# Patient Record
Sex: MALE | Race: WHITE | NOT HISPANIC OR LATINO | Employment: OTHER | URBAN - METROPOLITAN AREA
[De-identification: names, ages, dates, MRNs, and addresses within clinical notes are randomized per-mention and may not be internally consistent; named-entity substitution may affect disease eponyms.]

---

## 2019-11-14 ENCOUNTER — APPOINTMENT (OUTPATIENT)
Dept: RADIOLOGY | Facility: CLINIC | Age: 84
End: 2019-11-14
Payer: MEDICARE

## 2019-11-14 VITALS
BODY MASS INDEX: 31.07 KG/M2 | HEIGHT: 68 IN | DIASTOLIC BLOOD PRESSURE: 80 MMHG | SYSTOLIC BLOOD PRESSURE: 122 MMHG | WEIGHT: 205 LBS | HEART RATE: 78 BPM

## 2019-11-14 DIAGNOSIS — M75.42 IMPINGEMENT SYNDROME OF LEFT SHOULDER: Primary | ICD-10-CM

## 2019-11-14 DIAGNOSIS — M25.512 LEFT SHOULDER PAIN, UNSPECIFIED CHRONICITY: ICD-10-CM

## 2019-11-14 PROCEDURE — 73030 X-RAY EXAM OF SHOULDER: CPT

## 2019-11-14 PROCEDURE — 99203 OFFICE O/P NEW LOW 30 MIN: CPT | Performed by: ORTHOPAEDIC SURGERY

## 2019-11-14 NOTE — PROGRESS NOTES
Assessment:     1  Impingement syndrome of left shoulder    2  Left shoulder pain, unspecified chronicity        Plan:     Problem List Items Addressed This Visit        Other    Impingement syndrome of left shoulder - Primary     - Findings consistent with left shoulder impingement  Discussed findings and treatment options with the patient  I reviewed patient's left shoulder x-ray with him  I discussed prognosis of his condition   - At this time, a cortisone injection is not recommended due to not having a lot of pain today  - It's recommended a course of formal physical therapy due to the symptoms being mechanical issue    - Follow up in 6-8 weeks  - All patient's questions were answered to his satisfaction  This note is created using dictation transcription  It may contain typographical errors, grammatical errors, improperly dictated words, background noise and other errors  Relevant Orders    Ambulatory referral to Physical Therapy      Other Visit Diagnoses     Left shoulder pain, unspecified chronicity        Relevant Orders    XR shoulder 2+ vw left    Ambulatory referral to Physical Therapy         Subjective:     Patient ID: Yessi Ashton is a 80 y o  male  Chief Complaint:   Patient states that the left shoulder started about 2-3 months ago, with no known injury  RHD  He notes that it doesn't limit him with his activities, he just has localized pain on the lateral aspect of the shoulder  He does feel he has lost strength  He has no tried any oral NSAIDs or tylenol  Has some mild pain with putting his arm into a shirt first   No hx of injuries to the shoulder or surgeries  He denies numbness and tingling to the left hand  Information on patient's intake form was reviewed      Allergy:  Allergies   Allergen Reactions    Morphine And Related Other (See Comments)     confused     Medications:  all current active meds have been reviewed  Past Medical History:  Past Medical History: Diagnosis Date    Bilateral hearing loss     has h a  bilaterally    CAD (coronary artery disease)     Cataract of both eyes     Diabetes mellitus ([address] )     Enlarged prostate     HTN (hypertension)     Hx of bladder cancer     Myocardial infarction [facility]) 2013    Wears glasses      Past Surgical History:  Past Surgical History:   Procedure Laterality Date    CHOLECYSTECTOMY      CORONARY ARTERY BYPASS GRAFT  1999    X 3 done @ [facility] [address] CYSTOSTOMY W/ BLADDER BIOPSY      X 2    HERNIA REPAIR       [facility] CATARACT EXTRACAP,INSERT LENS Left 7/20/2016    Procedure: EXTRACTION EXTRACAPSULAR CATARACT PHACO INTRAOCULAR LENS (IOL); Surgeon: Óscar Nation MD;  Location:  MAIN OR;  Service: Ophthalmology     [address] Right 7/6/2016    Procedure: EXTRACTION EXTRACAPSULAR CATARACT PHACO INTRAOCULAR LENS (IOL); Surgeon: Óscar Nation MD;  Location: [facility];  Service: Ophthalmology     Family History:  Family History   Problem Relation Age of Onset    Cancer Mother     Cancer Father      Social History:  Social History     Substance and Sexual Activity   Alcohol Use No     Social History     Substance and Sexual Activity   Drug Use No     Social History     Tobacco Use   Smoking Status Never Smoker   Smokeless Tobacco Never Used     Review of Systems   Constitutional: Negative  HENT: Negative  Eyes: Negative  Respiratory: Negative  Cardiovascular: Negative  Gastrointestinal: Negative  Endocrine: Negative  Genitourinary: Negative  Musculoskeletal: Positive for arthralgias (Left shoulder)  Negative for joint swelling and neck pain  Skin: Negative  Neurological: Negative  Hematological: Negative  Psychiatric/Behavioral: Negative            Objective:  BP Readings from Last 1 Encounters:   11/14/19 122/80      Wt Readings from Last 1 Encounters:   11/14/19 93 kg (205 lb)      BMI:   Estimated body mass index is 31 17 kg/m² as calculated from the following:    Height as of this encounter: 5' 8" (1 727 m)  Weight as of this encounter: 93 kg (205 lb)  BSA:   Estimated body surface area is 2 07 meters squared as calculated from the following:    Height as of this encounter: 5' 8" (1 727 m)  Weight as of this encounter: 93 kg (205 lb)  Physical Exam   Constitutional: He is oriented to person, place, and time  He appears well-developed  HENT:   Head: Normocephalic and atraumatic  Eyes: Conjunctivae and EOM are normal    Neck: Neck supple  Pulmonary/Chest: Effort normal    Neurological: He is alert and oriented to person, place, and time  Skin: Skin is warm  Psychiatric: He has a normal mood and affect  Nursing note and vitals reviewed  Right Shoulder Exam     Range of Motion   Internal rotation 0 degrees: T8       Left Shoulder Exam     Tenderness   Left shoulder tenderness location: lateral aspect of the shoulder at the insertion of the deltoid  Range of Motion   Active abduction: 160 (no pain)   External rotation: 60 (no pain)   Forward flexion: 170 (no pain)   Internal rotation 0 degrees: T8     Muscle Strength   The patient has normal left shoulder strength  Tests   Apprehension: negative  [place] test: positive  Cross arm: negative  Impingement: positive  Drop arm: negative    Other   Erythema: absent  Sensation: normal  Pulse: present     Comments:  4/5 elbow flexion with some pain but no anterior shoulder pain  No pain with pronation/supination  - Speeds test            I have personally reviewed pertinent films in PACS and my interpretation is no fracture, dislocation of the Left shoulder, age appropriate arthritic change  No soft tissue calcification      Scribe Attestation    I,:   Radha Rizvi am acting as a scribe while in the presence of the attending physician :        I,:   Anitha Carolina MD personally performed the services described in this documentation    as scribed in my presence :

## 2019-11-14 NOTE — ASSESSMENT & PLAN NOTE
- Findings consistent with left shoulder impingement  Discussed findings and treatment options with the patient  I reviewed patient's left shoulder x-ray with him  I discussed prognosis of his condition   - At this time, a cortisone injection is not recommended due to not having a lot of pain today  - It's recommended a course of formal physical therapy due to the symptoms being mechanical issue    - Follow up in 6-8 weeks  - All patient's questions were answered to his satisfaction  This note is created using dictation transcription  It may contain typographical errors, grammatical errors, improperly dictated words, background noise and other errors

## 2019-11-21 ENCOUNTER — EVALUATION (OUTPATIENT)
Dept: PHYSICAL THERAPY | Facility: CLINIC | Age: 84
End: 2019-11-21
Payer: MEDICARE

## 2019-11-21 DIAGNOSIS — M25.512 LEFT SHOULDER PAIN, UNSPECIFIED CHRONICITY: Primary | ICD-10-CM

## 2019-11-21 DIAGNOSIS — M75.42 IMPINGEMENT SYNDROME OF LEFT SHOULDER: ICD-10-CM

## 2019-11-21 PROCEDURE — 97161 PT EVAL LOW COMPLEX 20 MIN: CPT | Performed by: PHYSICAL THERAPIST

## 2019-11-21 PROCEDURE — 97110 THERAPEUTIC EXERCISES: CPT | Performed by: PHYSICAL THERAPIST

## 2019-11-21 NOTE — PROGRESS NOTES
PT Evaluation     Today's date: 2019  Patient name: Cassia Villanueva  : 1933  MRN: [id number]  Referring provider: Viki Edward MD  Dx:   Encounter Diagnosis     ICD-10-CM    1  Left shoulder pain, unspecified chronicity M25 512 Ambulatory referral to Physical Therapy   2  Impingement syndrome of left shoulder M75 42 Ambulatory referral to Physical Therapy                  Assessment  Assessment details: Patient is a 80y o  year old male presenting to physical therapy with left shoulder pain  Patient presents with pain, decreased strength, decreased ROM, decreased joint mobility and decreased tolerance to activity  Due to these impairments patient has difficulty performing activities of daily living, recreational activities and engaging in social activities  Patient would benefit from skilled physical therapy services to address these impairments and to maximize function  Thank you for the referral    Impairments: abnormal or restricted ROM, activity intolerance, impaired physical strength, pain with function and scapular dyskinesis  Understanding of Dx/Px/POC: excellent  Goals  Impairment Goals:  1 ) Pt will have decreased sx at worst by 50% in 2-4 weeks  2 ) Pt will have improved shoulder flexion and abduction range of motion by 15 degrees in 2-4 weeks  3 ) Pt will have improved shoulder strength by 1/2 MMT without pain in 4-6 weeks  4 ) Pt will have decreased tenderness to palpation to left rotator cuff by 50% in 3-4 weeks  Functional Goals:  1 ) Pt will be independent in their home exercise program in 1 week  2 ) Pt will be able to reach a shelf at shoulder height without pain in 4-6 weeks  3 ) Pt will be able to complete all ADLs without pain in 6-8 weeks  4 ) Pt will have an improved FOTO score of 64/100 in 6-8 weeks        Plan  Patient would benefit from: skilled PT  Planned therapy interventions: joint mobilization, body mechanics training, balance, patient education, therapeutic exercise, home exercise program, neuromuscular re-education, strengthening, abdominal trunk stabilization, graded exercise, stretching and manual therapy  Frequency: 1x week  Duration in weeks: 8  Plan of Care beginning date: 2019  Plan of Care expiration date: 2020  Treatment plan discussed with: patient        Subjective Evaluation    History of Present Illness  Mechanism of injury: Pt is an 80 y o  Male presenting to physical therapy with left shoulder pain  Pt explains no MIRI, started about 2-3 months ago  Pt reports that his pain is local to the lateral aspect of his left shoulder, is not limiting him from ADL's at this point  Pt reports that his pain can radiate down into the lateral aspect of his left humerus  Doesn't travel down past his elbow  Pain is described as an ache  Pt denies shoulder weakness, neck pain, numbness and tingling, radiating pain down his left arm or clicking/popping in the shoulder  Pt reports that there is no rhyme or reason for when he shoulder bothers him  Aggs: Putting on a shirt, overhead activity, putting on a jacket, putting dishes away, doing the laundry  Eases: Rest  Pain  Current pain ratin  At best pain ratin  At worst pain rating: 10          Objective     Static Posture     Head  Forward  Shoulders  Rounded  Thoracic Spine  Hyperkyphosis  Palpation   Left   Tenderness of the middle deltoid, subscapularis, supraspinatus, teres major and teres minor  Tenderness     Left Shoulder   Tenderness in the infraspinatus tendon and supraspinatus tendon       Active Range of Motion   Left Shoulder   Flexion: 120 degrees   Abduction: 140 degrees with pain  External rotation BTH: C4   Internal rotation BTB: L2 with pain    Right Shoulder   Flexion: 125 degrees   Abduction: 145 degrees   External rotation BTH: C7   Internal rotation BTB: T12     Passive Range of Motion   Left Shoulder   Flexion: 159 degrees   Abduction: 160 degrees   External rotation 45°: 65 degrees   Internal rotation 45°: 75 degrees     Joint Play   Left Shoulder  Hypomobile in the posterior capsule and inferior capsule  Right Shoulder  Hypomobile in the posterior capsule and inferior capsule  Hypomobile: C6, C7, T1, T2, T3, T4, T5, T6, T7, T8, T9 and T10     Strength/Myotome Testing     Left Shoulder     Planes of Motion   Flexion: 4-   Extension: 4   Abduction: 4- (P!)   External rotation at 0°: 4- (P!)   Internal rotation at 0°: 4- (P!)     Right Shoulder     Planes of Motion   Flexion: 4   Extension: 4   Abduction: 4   External rotation at 0°: 4   Internal rotation at 0°: 4     Tests     Left Shoulder   Positive Hawkin's and painful arc  Negative apprehension, empty can, full can, laxity (step off) and posterior apprehension          Flowsheet Rows      Most Recent Value   PT/OT G-Codes   Current Score  53   Projected Score  64                   Precautions: [place], COPD, Hx Angina, Type II DM, MI (2013)    Daily Treatment Diary       Manuals 11/21            PROM L Shoulder                                                    Exercise Diary              Shoulder blade squeezes 10x10''            TB Ext/Row 2x10 GTB            Supine cane flexion 10x10''            Seated thoracic ext s' 5x15''            Doorway stretch exlbow ext 5x15''                                                                                                                                                                                                                                         Modalities

## 2019-11-25 ENCOUNTER — OFFICE VISIT (OUTPATIENT)
Dept: PHYSICAL THERAPY | Facility: CLINIC | Age: 84
End: 2019-11-25
Payer: MEDICARE

## 2019-11-25 DIAGNOSIS — M75.42 IMPINGEMENT SYNDROME OF LEFT SHOULDER: ICD-10-CM

## 2019-11-25 DIAGNOSIS — M25.512 LEFT SHOULDER PAIN, UNSPECIFIED CHRONICITY: Primary | ICD-10-CM

## 2019-11-25 PROCEDURE — 97140 MANUAL THERAPY 1/> REGIONS: CPT | Performed by: PHYSICAL THERAPIST

## 2019-11-25 PROCEDURE — 97110 THERAPEUTIC EXERCISES: CPT | Performed by: PHYSICAL THERAPIST

## 2019-11-25 NOTE — PROGRESS NOTES
Daily Note     Today's date: 2019  Patient name: Ganga Collins  : 1933  MRN: [id number]  Referring provider: Darren Lainez MD  Dx:   Encounter Diagnosis     ICD-10-CM    1  Left shoulder pain, unspecified chronicity M25 512    2  Impingement syndrome of left shoulder M75 42                   Subjective: Patient presents to clinic reporting 0/10 pain at L shoulder, but would have pain if he moved his shoulder "the wrong way"  He reports his shoulder has improved since last visit The patient also notes compliance with HEP, noting no issues  Objective: See treatment diary below      Assessment: Tolerated treatment well  Patient exhibited good technique with therapeutic exercises and would benefit from continued PT  Patient reported feeling pain in his quads with TB ER (no monies)- which may have been attributed to his posture seated on the table  Plan: Continue per plan of care              Precautions: [place], COPD, Hx Angina, Type II DM, MI ()    Daily Treatment Diary       Manuals            PROM L Shoulder  BH           Inf G-H w/ abduction  BH Gr III           PA mobs w/ ER  BH Gr II+                        Exercise Diary              Shoulder blade squeezes 10x10'' 10x10"           TB Ext/Row 2x10 GTB 2x10 GTB           Supine cane flexion 10x10'' 10x10''           Supine Cane ER  10x10''           Supine Cane scaption                          Seated thoracic ext s' 5x15''            Doorway stretch exlbow ext 5x15''            TB no monies  2x10 RTB           TB Scaption  2x10  GTB                                                                                                                                                                                                              Modalities                                             Patient was treated by [facility] SPT, under direct supervision of PT

## 2019-11-27 ENCOUNTER — APPOINTMENT (OUTPATIENT)
Dept: PHYSICAL THERAPY | Facility: CLINIC | Age: 84
End: 2019-11-27
Payer: MEDICARE

## 2019-12-04 ENCOUNTER — OFFICE VISIT (OUTPATIENT)
Dept: PHYSICAL THERAPY | Facility: CLINIC | Age: 84
End: 2019-12-04
Payer: MEDICARE

## 2019-12-04 DIAGNOSIS — M75.42 IMPINGEMENT SYNDROME OF LEFT SHOULDER: ICD-10-CM

## 2019-12-04 DIAGNOSIS — M25.512 LEFT SHOULDER PAIN, UNSPECIFIED CHRONICITY: Primary | ICD-10-CM

## 2019-12-04 PROCEDURE — 97140 MANUAL THERAPY 1/> REGIONS: CPT | Performed by: PHYSICAL THERAPIST

## 2019-12-04 PROCEDURE — 97110 THERAPEUTIC EXERCISES: CPT | Performed by: PHYSICAL THERAPIST

## 2019-12-04 NOTE — PROGRESS NOTES
Daily Note     Today's date: 2019  Patient name: Denver Weber  : 1933  MRN: [id number]  Referring provider: Susan Cassidy MD  Dx:   Encounter Diagnosis     ICD-10-CM    1  Left shoulder pain, unspecified chronicity M25 512    2  Impingement syndrome of left shoulder M75 42                   Subjective: Pt explains that his shoulder has been feeling better recently, notes that his pain levels are greatly reduced  Objective: See treatment diary below      Assessment: Tolerated treatment well  Patient demonstrated fatigue post treatment  Pt with good challenge with new TE this session, fatigue upon completion  Cueing to correct form and shoulder blade position during session, fatigue in periscapular musculature at end of session  Updated HEP to reflect changes made this session  Plan: Continue per plan of care            Precautions: [place], COPD, Hx Angina, Type II DM, MI ()    Daily Treatment Diary       Manuals           PROM L Shoulder  BH RN          Inf G-H w/ abduction  BH Gr III RN Gr 3          PA mobs w/ ER  BH Gr II+ RN Gr 3                       Exercise Diary              Shoulder blade squeezes 10x10'' 10x10"           TB Ext/Row 2x10 GTB 2x10 GTB 2x10 BTB          Supine cane flexion 10x10'' 10x10'' 10x10''          Supine Cane ER  10x10'' 10x10''          Supine Cane scaption                          Seated thoracic ext s' 5x15''            Doorway stretch exlbow ext 5x15''  5x15''          TB no monies  2x10 RTB           TB Scaption  2x10  GTB 2x10 GTB          Supine TB horiz  abd   2x10 GTB          TB IR/ER   2x10 BTB/GTB                                                                                                                                                                                   Modalities

## 2019-12-10 ENCOUNTER — OFFICE VISIT (OUTPATIENT)
Dept: PHYSICAL THERAPY | Facility: CLINIC | Age: 84
End: 2019-12-10
Payer: MEDICARE

## 2019-12-10 DIAGNOSIS — M25.512 LEFT SHOULDER PAIN, UNSPECIFIED CHRONICITY: Primary | ICD-10-CM

## 2019-12-10 DIAGNOSIS — M75.42 IMPINGEMENT SYNDROME OF LEFT SHOULDER: ICD-10-CM

## 2019-12-10 PROCEDURE — 97140 MANUAL THERAPY 1/> REGIONS: CPT | Performed by: PHYSICAL THERAPIST

## 2019-12-10 PROCEDURE — 97110 THERAPEUTIC EXERCISES: CPT | Performed by: PHYSICAL THERAPIST

## 2019-12-10 NOTE — PROGRESS NOTES
Daily Note     Today's date: 12/10/2019  Patient name: Woody Garcia  : 1933  MRN: [id number]  Referring provider: Jarvis Fernández MD  Dx:   Encounter Diagnosis     ICD-10-CM    1  Left shoulder pain, unspecified chronicity M25 512    2  Impingement syndrome of left shoulder M75 42                   Subjective: Patient presents to clinic reporting he has decreased pain at L shoulder since LV  The patient also notes compliance with HEP  Objective: See treatment diary below      Assessment: Tolerated treatment well  Patient demonstrated fatigue post treatment and exhibited good technique with therapeutic exercises  Patient reported no pain or discomfort during tx today, required TC and VC's to engage periscapular mm twice during resisted band exercises  Plan: Continue per plan of care  Progress treament per protocol            Precautions: [place], COPD, Hx Angina, Type II DM, MI ()    Daily Treatment Diary       Manuals 11/21 11/25 12/4 12/10         PROM L Shoulder  BH RN BH         Inf G-H w/ abduction  BH Gr III RN Gr 3 BH         PA mobs w/ ER  BH Gr II+ RN Gr 3 BH                      Exercise Diary              Shoulder blade squeezes 10x10'' 10x10"           TB Ext/Row 2x10 GTB 2x10 GTB 2x10 BTB 2x10 GTB         Supine cane flexion 10x10'' 10x10'' 10x10'' 10x10''         Supine Cane ER  10x10'' 10x10'' 10x10''         Supine Cane scaption                          Seated thoracic ext s' 5x15''            Doorway stretch elbow ext 5x15''  5x15''          TB no monies  2x10 RTB  2x10  GTB         TB Scaption  2x10  GTB 2x10 GTB 2x10 GTB         Supine TB horiz  abd   2x10 GTB 2x10 GTB         TB IR/ER   2x10 BTB/GTB 2x15 BTB/GTB         TB D1 Shoulder ext    2x10  GTB         Standing TB abd    2x10  GTB                                                                                                                                                        Modalities Patient was treated by [facility] SPT, under direct supervision of PT

## 2019-12-16 ENCOUNTER — OFFICE VISIT (OUTPATIENT)
Dept: PHYSICAL THERAPY | Facility: CLINIC | Age: 84
End: 2019-12-16
Payer: MEDICARE

## 2019-12-16 DIAGNOSIS — M75.42 IMPINGEMENT SYNDROME OF LEFT SHOULDER: ICD-10-CM

## 2019-12-16 DIAGNOSIS — M25.512 LEFT SHOULDER PAIN, UNSPECIFIED CHRONICITY: Primary | ICD-10-CM

## 2019-12-16 PROCEDURE — 97110 THERAPEUTIC EXERCISES: CPT | Performed by: PHYSICAL THERAPIST

## 2019-12-16 PROCEDURE — 97140 MANUAL THERAPY 1/> REGIONS: CPT | Performed by: PHYSICAL THERAPIST

## 2019-12-16 NOTE — PROGRESS NOTES
Daily Note     Today's date: 2019  Patient name: Kalpana Josue  : 1933  MRN: [id number]  Referring provider: Dara Perez MD  Dx:   Encounter Diagnosis     ICD-10-CM    1  Left shoulder pain, unspecified chronicity M25 512    2  Impingement syndrome of left shoulder M75 42                   Subjective: Patient presents to clinic reporting 5/10 pain at L shoulder  The patient also notes compliance with HEP  Objective: See treatment diary below      Assessment: Tolerated treatment well  Patient demonstrated fatigue post treatment, exhibited good technique with therapeutic exercises and would benefit from continued PT  Patient required less verbal and auditory cues during therex today  He reported minor shoulder discomfort during a few theraband exercises but nothing over 2/10 pain  Plan: Continue per plan of care  Progress treatment as tolerated             Precautions: [place], COPD, Hx Angina, Type II DM, MI ()    Daily Treatment Diary       Manuals 11/21 11/25 12/4 12/10 12/16        PROM L Shoulder  BH RN [facility]        Inf G-H w/ abduction  BH Gr III RN Gr 3 [facility]        PA mobs w/ ER  BH Gr II+ RN Gr 3 [facility]                     Exercise Diary              Shoulder blade squeezes 10x10'' 10x10"           TB Ext/Row 2x10 GTB 2x10 GTB 2x10 BTB 2x10 GTB         Supine cane flexion 10x10'' 10x10'' 10x10'' 10x10'' 10x10''        Supine Cane ER  10x10'' 10x10'' 10x10'' 10x10''        Supine Cane scaption                          Seated thoracic ext s' 5x15''            Doorway stretch elbow ext 5x15''  5x15''          TB no monies  2x10 RTB  2x10  GTB 2x10  BTB        TB Scaption  2x10  GTB 2x10 GTB 2x10 GTB         Supine TB horiz  abd   2x10 GTB 2x10 GTB 2x10 GTB        TB IR/ER   2x10 BTB/GTB 2x15 BTB/GTB 2x15 BTB        TB D1 Shoulder ext    2x10  GTB 2x10  BTB        Standing TB abd    2x10  GTB 2x10  BTB        Standing TB D2 Ext     2x10  GTB Modalities                                             Patient was treated by [facility] SPT, under direct supervision of PT

## 2019-12-26 ENCOUNTER — OFFICE VISIT (OUTPATIENT)
Dept: PHYSICAL THERAPY | Facility: CLINIC | Age: 84
End: 2019-12-26
Payer: MEDICARE

## 2019-12-26 DIAGNOSIS — M75.42 IMPINGEMENT SYNDROME OF LEFT SHOULDER: ICD-10-CM

## 2019-12-26 DIAGNOSIS — M25.512 LEFT SHOULDER PAIN, UNSPECIFIED CHRONICITY: Primary | ICD-10-CM

## 2019-12-26 PROCEDURE — 97110 THERAPEUTIC EXERCISES: CPT | Performed by: PHYSICAL THERAPIST

## 2019-12-26 PROCEDURE — 97140 MANUAL THERAPY 1/> REGIONS: CPT | Performed by: PHYSICAL THERAPIST

## 2019-12-26 NOTE — PROGRESS NOTES
Daily Note     Today's date: 2019  Patient name: Juaquin Manzo  : 1933  MRN: [id number]  Referring provider: Andreea Garza MD  Dx:   Encounter Diagnosis     ICD-10-CM    1  Left shoulder pain, unspecified chronicity M25 512    2  Impingement syndrome of left shoulder M75 42                   Subjective: Patient presents to clinic reporting 0/10 pain at L shoulder  The patient also notes compliance with HEP  Objective: See treatment diary below      Assessment: Tolerated treatment well  Patient demonstrated fatigue post treatment and would benefit from continued PT  Patient progressed exercises intensity and progressed as per flow sheet, noting no pain during therex  Plan: Continue per plan of care            Precautions: [place], COPD, Hx Angina, Type II DM, MI ()    Daily Treatment Diary       Manuals 11/21 11/25 12/4 12/10 12/16 12/26       PROM L Shoulder  BH RN [facility]       Inf G-H w/ abduction   Gr III RN Gr 3 [facility]       PA mobs w/ ER   Gr II+ RN Gr 3 [facility]                    Exercise Diary              Shoulder blade squeezes 10x10'' 10x10"           TB Ext/Row 2x10 GTB 2x10 GTB 2x10 BTB 2x10 GTB  2x10 BLK  ea       Supine cane flexion 10x10'' 10x10'' 10x10'' 10x10'' 10x10''        Supine Cane ER  10x10'' 10x10'' 10x10'' 10x10''        Supine Cane scaption                          Seated thoracic ext s' 5x15''            Doorway stretch elbow ext 5x15''  5x15''   5x15''       TB no monies  2x10 RTB  2x10  GTB 2x10  BTB 2x10  BTB       TB Scaption  2x10  GTB 2x10 GTB 2x10 GTB         Supine TB horiz  abd   2x10 GTB 2x10 GTB 2x10 GTB 2x10 bTB       TB IR/ER   2x10 BTB/GTB 2x15 BTB/GTB 2x15 BTB 2x15 BTB       TB D1 Shoulder ext    2x10  GTB 2x10  BTB 2x10  BTB       Standing TB abd    2x10  GTB 2x10  BTB 2x10  BTB       Standing TB D2 Ext     2x10  GTB 2x10  GTB       Towel stretch IR/ER      5x15''       90-90 Shoulder ER      2x10  RTB       Seated Shoulder ER 3x10  #3 #4 nv                                                                                                 Modalities                                             Patient was treated by [facility] SPT, under direct supervision of PT

## 2019-12-27 ENCOUNTER — OFFICE VISIT (OUTPATIENT)
Dept: OBGYN CLINIC | Facility: CLINIC | Age: 84
End: 2019-12-27
Payer: MEDICARE

## 2019-12-27 VITALS
WEIGHT: 205 LBS | BODY MASS INDEX: 31.07 KG/M2 | SYSTOLIC BLOOD PRESSURE: 123 MMHG | DIASTOLIC BLOOD PRESSURE: 70 MMHG | HEIGHT: 68 IN

## 2019-12-27 DIAGNOSIS — M75.42 IMPINGEMENT SYNDROME OF LEFT SHOULDER: Primary | ICD-10-CM

## 2019-12-27 PROCEDURE — 99213 OFFICE O/P EST LOW 20 MIN: CPT | Performed by: ORTHOPAEDIC SURGERY

## 2019-12-27 NOTE — PROGRESS NOTES
Assessment:     1  Impingement syndrome of left shoulder        Plan:  The patient was seen and examined by Dr Elana Hartman and myself  Problem List Items Addressed This Visit        Other    Impingement syndrome of left shoulder - Primary     Findings consistent with left shoulder impingement-improved  Findings and treatment options were discussed with the patient  He is doing very well  He is to continue physical therapy until completion and then continue home exercises  He is to try to avoid repetitive overhead activities  Follow-up as needed if any problems arise  All patient's questions were answered to his satisfaction  This note is created using dictation transcription  It may contain typographical errors, grammatical errors, improperly dictated words, background noise and other errors  Subjective:     Patient ID: Beatriz Rodriguez is a 80 y o  male  Chief Complaint: This is an 55-year-old white male following up for left shoulder impingement  He has been attending physical therapy regularly since last visit  He states he feels 85% better  He has minimal discomfort of that left shoulder, and he feels it when reaching across his body and with internal rotation at times  His strength has improved      Allergy:  Allergies   Allergen Reactions    Morphine And Related Other (See Comments)     confused     Medications:  all current active meds have been reviewed  Past Medical History:  Past Medical History:   Diagnosis Date    Bilateral hearing loss     has h a  bilaterally    CAD (coronary artery disease)     Cataract of both eyes     Diabetes mellitus ([facility] [address] )     Enlarged prostate     HTN (hypertension)     Hx of bladder cancer     Myocardial infarction [facility]) 2013    Wears glasses      Past Surgical History:  Past Surgical History:   Procedure Laterality Date    CHOLECYSTECTOMY      CORONARY ARTERY BYPASS GRAFT  1999    X 3 done @ [facility]    CYSTOSTOMY W/ BLADDER BIOPSY X 2    HERNIA REPAIR      NC REMV CATARACT EXTRACAP,INSERT LENS Left 7/20/2016    Procedure: EXTRACTION EXTRACAPSULAR CATARACT PHACO INTRAOCULAR LENS (IOL); Surgeon: Korey Espinoza MD;  Location:  MAIN OR;  Service: Ophthalmology     [address] Right 7/6/2016    Procedure: EXTRACTION EXTRACAPSULAR CATARACT PHACO INTRAOCULAR LENS (IOL); Surgeon: Korey Espinoza MD;  Location:  MAIN OR;  Service: Ophthalmology     Family History:  Family History   Problem Relation Age of Onset    Cancer Mother     Cancer Father      Social History:  Social History     Substance and Sexual Activity   Alcohol Use No     Social History     Substance and Sexual Activity   Drug Use No     Social History     Tobacco Use   Smoking Status Never Smoker   Smokeless Tobacco Never Used     Review of Systems   Constitutional: Negative  HENT: Negative  Eyes: Negative  Respiratory: Negative  Cardiovascular: Negative  Gastrointestinal: Negative  Endocrine: Negative  Genitourinary: Negative  Musculoskeletal: Negative for arthralgias (Left shoulder), joint swelling and neck pain  Skin: Negative  Neurological: Negative  Hematological: Negative  Psychiatric/Behavioral: Negative  Objective:  BP Readings from Last 1 Encounters:   12/27/19 123/70      Wt Readings from Last 1 Encounters:   12/27/19 93 kg (205 lb)      BMI:   Estimated body mass index is 31 17 kg/m² as calculated from the following:    Height as of this encounter: 5' 8" (1 727 m)  Weight as of this encounter: 93 kg (205 lb)  BSA:   Estimated body surface area is 2 07 meters squared as calculated from the following:    Height as of this encounter: 5' 8" (1 727 m)  Weight as of this encounter: 93 kg (205 lb)  Physical Exam   Constitutional: He is oriented to person, place, and time  He appears well-developed  HENT:   Head: Normocephalic and atraumatic     Eyes: Conjunctivae and EOM are normal  Neck: Neck supple  Pulmonary/Chest: Effort normal    Neurological: He is alert and oriented to person, place, and time  Skin: Skin is warm  Psychiatric: He has a normal mood and affect  Nursing note and vitals reviewed  Left Shoulder Exam     Tenderness   The patient is experiencing no tenderness  Range of Motion   The patient has normal left shoulder ROM  Muscle Strength   The patient has normal left shoulder strength      Tests   Apprehension: negative  [place] test: negative  Cross arm: negative  Impingement: positive  Drop arm: negative    Other   Erythema: absent  Sensation: normal  Pulse: present     Comments:  No pain with resisted strength testing

## 2019-12-27 NOTE — ASSESSMENT & PLAN NOTE
Findings consistent with left shoulder impingement-improved  Findings and treatment options were discussed with the patient  He is doing very well  He is to continue physical therapy until completion and then continue home exercises  He is to try to avoid repetitive overhead activities  Follow-up as needed if any problems arise  All patient's questions were answered to his satisfaction  This note is created using dictation transcription  It may contain typographical errors, grammatical errors, improperly dictated words, background noise and other errors

## 2020-01-03 ENCOUNTER — OFFICE VISIT (OUTPATIENT)
Dept: PHYSICAL THERAPY | Facility: CLINIC | Age: 85
End: 2020-01-03
Payer: MEDICARE

## 2020-01-03 DIAGNOSIS — M75.42 IMPINGEMENT SYNDROME OF LEFT SHOULDER: ICD-10-CM

## 2020-01-03 DIAGNOSIS — M25.512 LEFT SHOULDER PAIN, UNSPECIFIED CHRONICITY: Primary | ICD-10-CM

## 2020-01-03 PROCEDURE — 97110 THERAPEUTIC EXERCISES: CPT | Performed by: PHYSICAL THERAPIST

## 2020-01-03 PROCEDURE — 97140 MANUAL THERAPY 1/> REGIONS: CPT | Performed by: PHYSICAL THERAPIST

## 2020-01-03 NOTE — PROGRESS NOTES
Daily Note     Today's date: 1/3/2020  Patient name: Kalpana Josue  : 1933  MRN: [id number]  Referring provider: Dara Perez MD  Dx:   Encounter Diagnosis     ICD-10-CM    1  Left shoulder pain, unspecified chronicity M25 512    2  Impingement syndrome of left shoulder M75 42                    Subjective: Patient presents to clinic reporting 0/10 pain at L shoulder at rest, with 3/10 pain when he horizontally adducts  The patient also notes compliance with HEP  Objective: See treatment diary below      Assessment: Tolerated treatment well  Patient demonstrated fatigue post treatment  Patient improved strength with increased resistance during TB exercises today noted as per flowsheet  Plan: Continue per plan of care             Precautions: [place], COPD, Hx Angina, Type II DM, MI ()    Daily Treatment Diary       Manuals 11/21 11/25 12/4 12/10 12/16 12/26 1/3      PROM L Shoulder   RN [facility]      Inf G-H w/ abduction  BH Gr III RN Gr 3 [facility]      PA mobs w/ ER   Gr II+ RN Gr 3 [facility]                   Exercise Diary              Shoulder blade squeezes 10x10'' 10x10"           TB Ext/Row 2x10 GTB 2x10 GTB 2x10 BTB 2x10 GTB  2x10 BLK  ea 2x10 BLK  ea      Supine cane flexion 10x10'' 10x10'' 10x10'' 10x10'' 10x10''        Supine Cane ER  10x10'' 10x10'' 10x10'' 10x10''        Supine Cane scaption                          Seated thoracic ext s' 5x15''            Doorway stretch elbow ext 5x15''  5x15''   5x15'' 3-way  3x30"      TB no monies  2x10 RTB  2x10  GTB 2x10  BTB 2x10  BTB 2x10  BLK      TB Scaption  2x10  GTB 2x10 GTB 2x10 GTB   2x10 BTB      Supine TB horiz  abd   2x10 GTB 2x10 GTB 2x10 GTB 2x10 bTB 2x10 bTB      TB IR/ER   2x10 BTB/GTB 2x15 BTB/GTB 2x15 BTB 2x15 BTB IR  2x15 BTB      TB D1 Shoulder ext    2x10  GTB 2x10  BTB 2x10  BTB 2x10  BTB      Standing TB abd    2x10  GTB 2x10  BTB 2x10  BTB 2x10  BTB      Standing TB D2 Ext     2x10  GTB 2x10  GTB Towel stretch IR/ER      5x15'' 5x15''      90-90 Shoulder ER      2x10  RTB 2x10  RTB      Seated Shoulder ER      3x10  #3 3x10  #4      TB D1 Ext       2x10  GTB                                                                                    Modalities                                             Patient was treated by [facility] SPT, under direct supervision of PT

## 2020-01-08 ENCOUNTER — OFFICE VISIT (OUTPATIENT)
Dept: PHYSICAL THERAPY | Facility: CLINIC | Age: 85
End: 2020-01-08
Payer: MEDICARE

## 2020-01-08 DIAGNOSIS — M25.512 LEFT SHOULDER PAIN, UNSPECIFIED CHRONICITY: Primary | ICD-10-CM

## 2020-01-08 DIAGNOSIS — M75.42 IMPINGEMENT SYNDROME OF LEFT SHOULDER: ICD-10-CM

## 2020-01-08 PROCEDURE — 97140 MANUAL THERAPY 1/> REGIONS: CPT | Performed by: PHYSICAL THERAPIST

## 2020-01-08 PROCEDURE — 97110 THERAPEUTIC EXERCISES: CPT | Performed by: PHYSICAL THERAPIST

## 2020-01-08 NOTE — PROGRESS NOTES
Daily Note     Today's date: 2020  Patient name: Manny Ware  : 1933  MRN: [id number]  Referring provider: Wendi Ott MD  Dx:   Encounter Diagnosis     ICD-10-CM    1  Left shoulder pain, unspecified chronicity M25 512    2  Impingement syndrome of left shoulder M75 42                   Subjective: Patient presents to clinic repoting no pain at L shoulder, noting he's a little fatigued from cleaning snow off his car- but is happy to report he had no shoulder pain while cleaning his car off  The patient also notes compliance with HEP  Patient notes he was just a few minutes late today because his wife fell in the kitchen  Objective: See treatment diary below      Assessment: Tolerated treatment well  Patient demonstrated fatigue post treatment  Patient tolerated an increase in resistance for a significant amount of theraband-resisted exercises today noted as per flowsheet  Patient noted no pain during tx and no complicaitons at conclusion of visit  Plan: Continue per plan of care  Progress treatment as tolerated             Precautions: [place], COPD, Hx Angina, Type II DM, MI ()    Daily Treatment Diary       Manuals 11/21 11/25 12/4 12/10 12/16 12/26 1/3 1/8     PROM L Shoulder   RN [facility]     Inf G-H w/ abduction   Gr III RN Gr 3 [facility]     PA mobs w/ ER   Gr II+ RN Gr 3 [facility]     GH distraction w/ abduction             Exercise Diary              Shoulder blade squeezes 10x10'' 10x10"           TB Ext/Row 2x10 GTB 2x10 GTB 2x10 BTB 2x10 GTB  2x10 BLK  ea 2x10 BLK  ea 2x10  BLK  1x10 GRY  ea     Supine cane flexion 10x10'' 10x10'' 10x10'' 10x10'' 10x10''        Supine Cane ER  10x10'' 10x10'' 10x10'' 10x10''        Supine Cane scaption                          Seated thoracic ext s' 5x15''            Doorway stretch elbow ext 5x15''  5x15''   5x15'' 3-way  3x30"      TB no monies  2x10 RTB  2x10  GTB 2x10  BTB 2x10  BTB 2x10  BLK 2x10  BLK     TB Scaption  2x10  GTB 2x10 GTB 2x10 GTB   2x10 BTB 2x10 BTB     Supine TB horiz  abd   2x10 GTB 2x10 GTB 2x10 GTB 2x10 bTB 2x10 bTB 2x10   BLK     TB IR/ER   2x10 BTB/GTB 2x15 BTB/GTB 2x15 BTB 2x15 BTB IR  2x15 BTB IR  2x15 GRY  ER  2x10     TB D1 Shoulder ext    2x10  GTB 2x10  BTB 2x10  BTB 2x10  BTB 2x10  BLK     Standing TB abd    2x10  GTB 2x10  BTB 2x10  BTB 2x10  BTB 2x10  BLK     Standing TB D2 Ext     2x10  GTB 2x10  GTB  2x10  BTB     Towel stretch IR/ER      5x15'' 5x15'' nv     90-90 Shoulder ER      2x10  RTB 2x10  RTB nv     Seated Shoulder ER      3x10  #3 3x10  #4 2x10  #5     Supine TB D2 Ext       2x10  GTB 2x10  BTB                                                                                   Modalities                                             Patient was treated by [facility] SPT, under direct supervision of PT

## 2020-01-13 ENCOUNTER — OFFICE VISIT (OUTPATIENT)
Dept: GASTROENTEROLOGY | Facility: CLINIC | Age: 85
End: 2020-01-13
Payer: MEDICARE

## 2020-01-13 VITALS
BODY MASS INDEX: 30.92 KG/M2 | HEIGHT: 68 IN | SYSTOLIC BLOOD PRESSURE: 144 MMHG | DIASTOLIC BLOOD PRESSURE: 84 MMHG | HEART RATE: 54 BPM | WEIGHT: 204 LBS

## 2020-01-13 DIAGNOSIS — Z80.0 FAMILY HISTORY OF COLON CANCER: ICD-10-CM

## 2020-01-13 DIAGNOSIS — K44.9 HIATAL HERNIA: ICD-10-CM

## 2020-01-13 DIAGNOSIS — D50.0 IRON DEFICIENCY ANEMIA DUE TO CHRONIC BLOOD LOSS: Primary | ICD-10-CM

## 2020-01-13 PROBLEM — E11.9 DIABETES MELLITUS (HCC): Status: ACTIVE | Noted: 2020-01-13

## 2020-01-13 PROBLEM — I25.10 CORONARY ARTERY DISEASE: Status: ACTIVE | Noted: 2020-01-13

## 2020-01-13 PROCEDURE — 99213 OFFICE O/P EST LOW 20 MIN: CPT | Performed by: INTERNAL MEDICINE

## 2020-01-13 RX ORDER — PANTOPRAZOLE SODIUM 40 MG/1
40 TABLET, DELAYED RELEASE ORAL DAILY
COMMUNITY

## 2020-01-13 NOTE — PROGRESS NOTES
[facility] - Outpatient Follow-up Note  Diane Souza 80 y o  male MRN: [id number]  Encounter: [id number]    ASSESSMENT AND PLAN:      1  Iron deficiency anemia due to chronic blood loss  S/P IV iron by Dr Erica Friend 2018  CBC and ferritin essentially normal December 2019  - repeat H&H 6 months per Hematology    2  Hiatal hernia  No reflux symptoms  No issues with dysphagia   -continue pantoprazole daily    3  Family history of colon cancer  Brother with colon cancer  Last colonoscopy June 2014  No plans on repeating secondary to age      Followup Appointment:  1 year  ______________________________________________________________________    Chief Complaint   Patient presents with    Annual follow up     Hx anemia; anal fissure     HPI:  The patient returns today for follow-up  His history of iron deficiency anemia secondary to large hiatal hernia and Lito's erosions  His last EGD was June 2018  He received a course of IV iron by Dr Erica Friend in 2018  He had blood work done in December which revealed a hemoglobin of 13 3 and a ferritin of 122  He has been maintained chronically on pantoprazole  He denies any upper GI symptoms  His bowels are normal   He denies any melena or hematochezia  His weight is stable      Historical Information   Past Medical History:   Diagnosis Date    Anal fissure     Bilateral hearing loss     has h a  bilaterally    CAD (coronary artery disease)     Cataract of both eyes     [facility] [address] [facility]     Enlarged prostate     Gram negative sepsis ([facility] [address] ) 2006    HTN (hypertension)     Hx of bladder cancer     Hypercholesterolemia     Iron deficiency anemia     secondary to [place] & Lito's Erosions s/p IV iron    Myocardial infarction [facility]) 2013    Wears glasses      Past Surgical History:   Procedure Laterality Date    ANAL FISSURECTOMY      BLADDER TUMOR EXCISION      CATARACT EXTRACTION, BILATERAL      CHOLECYSTECTOMY      COLONOSCOPY  06/13/2014    CORONARY ARTERY BYPASS GRAFT  1999    X 3 done @ [facility]    CYSTOSTOMY W/ BLADDER BIOPSY      X 2    EGD  06/04/2018    HERNIA REPAIR      CT XCAPSL CTRC RMVL INSJ IO LENS PROSTH W/O ECP Left 7/20/2016    Procedure: EXTRACTION EXTRACAPSULAR CATARACT PHACO INTRAOCULAR LENS (IOL); Surgeon: Gina Waggoner MD;  [facility];  Service: Ophthalmology    CT XCAPSL CTRC RMVL INSJ IO LENS PROSTH W/O ECP Right 7/6/2016    Procedure: EXTRACTION EXTRACAPSULAR CATARACT PHACO INTRAOCULAR LENS (IOL); Surgeon: Gina Waggoner MD;  [facility];  Service: Ophthalmology     Social History     Substance and Sexual Activity   Alcohol Use No     Social History     Substance and Sexual Activity   Drug Use No     Social History     Tobacco Use   Smoking Status Never Smoker   Smokeless Tobacco Never Used     Family History   Problem Relation Age of Onset    Cancer Mother     Ovarian cancer Mother     Cancer Father     Lung cancer Father     Stomach cancer Sister     Colon cancer Brother     Esophageal cancer Brother          Current Outpatient Medications:     amLODIPine (NORVASC) 5 mg tablet    aspirin 81 MG tablet    glyBURIDE (DIABETA) 1 25 mg tablet    nitroglycerin (NITROSTAT) 0 4 mg SL tablet    pantoprazole (PROTONIX) 40 mg tablet    rosuvastatin (CRESTOR) 5 mg tablet    Saw Palmetto 450 MG CAPS    Specialty Vitamins Products (PROSTATE PO)    VITAMIN D, CHOLECALCIFEROL, PO  Allergies   Allergen Reactions    Morphine And Related Other (See Comments)     confused     Reviewed medications and allergies and updated as indicated    PHYSICAL EXAM:    Blood pressure 144/84, pulse (!) 54, height 5' 8" (1 727 m), weight 92 5 kg (204 lb)  Body mass index is 31 02 kg/m²  General Appearance: NAD, cooperative, alert  Eyes: Anicteric, PERRLA, EOMI  ENT:  Normocephalic, atraumatic, normal mucosa      Neck:  Supple, symmetrical, trachea midline  Resp:  Clear to auscultation bilaterally; no rales, rhonchi or wheezing; respirations unlabored   CV:  S1 S2, Regular rate and rhythm; no murmur, rub, or gallop  GI:  Soft, non-tender, non-distended; normal bowel sounds; no masses, no organomegaly   Rectal: Deferred  Musculoskeletal: No cyanosis, clubbing or edema  Normal ROM  Skin:  No jaundice, rashes, or lesions   Heme/Lymph: No palpable cervical lymphadenopathy  Psych: Normal affect, good eye contact  Neuro: No gross deficits, AAOx3    Lab Results:     Lab Results   Component Value Date    K 4 1 06/09/2016     06/09/2016    CO2 27 06/09/2016    BUN 20 06/09/2016    CREATININE 1 30 06/09/2016    CALCIUM 8 8 06/09/2016    AST 23 06/09/2016    ALT 25 06/09/2016    ALKPHOS 79 06/09/2016    EGFR 52 7 06/09/2016     Hemoglobin 13 3, hematocrit 39 7, platelet count 600, WBC 4 3, and ferritin 122  (12/12/19)    Radiology Results:   No results found

## 2020-01-13 NOTE — LETTER
January 13, 2020     Woodrow Silva DO  [address]  ([address])  [facility] [id number]    Patient: Eva Lloyd   YOB: 1933   Date of Visit: 1/13/2020       Dear Dr Yifan Shaw: Thank you for referring Rober Mancera to me for evaluation  Below are my notes for this consultation  If you have questions, please do not hesitate to call me  I look forward to following your patient along with you  Sincerely,        Ankur Severino MD        CC: MD Ankur Euceda MD  1/13/2020  3:54 PM  Sign at close encounter  [facility] - Outpatient Follow-up Note  Eva Lloyd 80 y o  male MRN: [id number]  Encounter: [id number]    ASSESSMENT AND PLAN:      1  Iron deficiency anemia due to chronic blood loss  S/P IV iron by Dr Bon Jimenez 2018  CBC and ferritin essentially normal December 2019  - repeat H&H 6 months per Hematology    2  Hiatal hernia  No reflux symptoms  No issues with dysphagia   -continue pantoprazole daily    3  Family history of colon cancer  Brother with colon cancer  Last colonoscopy June 2014  No plans on repeating secondary to age      Followup Appointment:  1 year  ______________________________________________________________________    Chief Complaint   Patient presents with    Annual follow up     Hx anemia; anal fissure     HPI:  The patient returns today for follow-up  His history of iron deficiency anemia secondary to large hiatal hernia and Lito's erosions  His last EGD was June 2018  He received a course of IV iron by Dr Bon Jimenez in 2018  He had blood work done in December which revealed a hemoglobin of 13 3 and a ferritin of 122  He has been maintained chronically on pantoprazole  He denies any upper GI symptoms  His bowels are normal   He denies any melena or hematochezia  His weight is stable      Historical Information   Past Medical History:   Diagnosis Date    Anal fissure     Bilateral hearing loss     has h a  bilaterally    CAD (coronary artery disease)     Cataract of both eyes     Diabetes mellitus ([facility] [address]     Enlarged prostate     Gram negative sepsis ([facility] [address] ) 2006    HTN (hypertension)     Hx of bladder cancer     Hypercholesterolemia     Iron deficiency anemia     secondary to [facility] & Lito's Erosions s/p IV iron    Myocardial infarction [facility]) 2013    Wears glasses      Past Surgical History:   Procedure Laterality Date    ANAL FISSURECTOMY      BLADDER TUMOR EXCISION      CATARACT EXTRACTION, BILATERAL      CHOLECYSTECTOMY      COLONOSCOPY  06/13/2014    CORONARY ARTERY BYPASS GRAFT  1999    X 3 done @ [facility]    CYSTOSTOMY W/ BLADDER BIOPSY      X 2    EGD  06/04/2018    HERNIA REPAIR      DE XCAPSL CTRC RMVL INSJ IO LENS PROSTH W/O ECP Left 7/20/2016    Procedure: EXTRACTION EXTRACAPSULAR CATARACT PHACO INTRAOCULAR LENS (IOL); Surgeon: Korey Espinoza MD;  [facility];  Service: Ophthalmology    DE XCAPSL CTRC RMVL INSJ IO LENS PROSTH W/O ECP Right 7/6/2016    Procedure: EXTRACTION EXTRACAPSULAR CATARACT PHACO INTRAOCULAR LENS (IOL);   Surgeon: Korey Espinoza MD;  [facility];  Service: Ophthalmology     Social History     Substance and Sexual Activity   Alcohol Use No     Social History     Substance and Sexual Activity   Drug Use No     Social History     Tobacco Use   Smoking Status Never Smoker   Smokeless Tobacco Never Used     Family History   Problem Relation Age of Onset    Cancer Mother     Ovarian cancer Mother     Cancer Father     Lung cancer Father     Stomach cancer Sister     Colon cancer Brother     Esophageal cancer Brother          Current Outpatient Medications:     amLODIPine (NORVASC) 5 mg tablet    aspirin 81 MG tablet    glyBURIDE (DIABETA) 1 25 mg tablet    nitroglycerin (NITROSTAT) 0 4 mg SL tablet    pantoprazole (PROTONIX) 40 mg tablet    rosuvastatin (CRESTOR) 5 mg tablet    Saw Palmetto 450 MG CAPS    Specialty Vitamins Products (PROSTATE PO)    VITAMIN D, CHOLECALCIFEROL, PO  Allergies   Allergen Reactions    Morphine And Related Other (See Comments)     confused     Reviewed medications and allergies and updated as indicated    PHYSICAL EXAM:    Blood pressure 144/84, pulse (!) 54, height 5' 8" (1 727 m), weight 92 5 kg (204 lb)  Body mass index is 31 02 kg/m²  General Appearance: NAD, cooperative, alert  Eyes: Anicteric, PERRLA, EOMI  ENT:  Normocephalic, atraumatic, normal mucosa  Neck:  Supple, symmetrical, trachea midline  Resp:  Clear to auscultation bilaterally; no rales, rhonchi or wheezing; respirations unlabored   CV:  S1 S2, Regular rate and rhythm; no murmur, rub, or gallop  GI:  Soft, non-tender, non-distended; normal bowel sounds; no masses, no organomegaly   Rectal: Deferred  Musculoskeletal: No cyanosis, clubbing or edema  Normal ROM  Skin:  No jaundice, rashes, or lesions   Heme/Lymph: No palpable cervical lymphadenopathy  Psych: Normal affect, good eye contact  Neuro: No gross deficits, AAOx3    Lab Results:     Lab Results   Component Value Date    K 4 1 06/09/2016     06/09/2016    CO2 27 06/09/2016    BUN 20 06/09/2016    CREATININE 1 30 06/09/2016    CALCIUM 8 8 06/09/2016    AST 23 06/09/2016    ALT 25 06/09/2016    ALKPHOS 79 06/09/2016    EGFR 52 7 06/09/2016     Hemoglobin 13 3, hematocrit 39 7, platelet count 804, WBC 4 3, and ferritin 122  (12/12/19)    Radiology Results:   No results found

## 2020-01-15 ENCOUNTER — OFFICE VISIT (OUTPATIENT)
Dept: PHYSICAL THERAPY | Facility: CLINIC | Age: 85
End: 2020-01-15
Payer: MEDICARE

## 2020-01-15 DIAGNOSIS — M75.42 IMPINGEMENT SYNDROME OF LEFT SHOULDER: ICD-10-CM

## 2020-01-15 DIAGNOSIS — M25.512 LEFT SHOULDER PAIN, UNSPECIFIED CHRONICITY: Primary | ICD-10-CM

## 2020-01-15 PROCEDURE — 97110 THERAPEUTIC EXERCISES: CPT | Performed by: PHYSICAL THERAPIST

## 2020-01-15 NOTE — PROGRESS NOTES
Daily Note     Today's date: 1/15/2020  Patient name: Navdeep Rubio  : 1933  MRN: [id number]  Referring provider: Toi Simon MD  Dx:   Encounter Diagnosis     ICD-10-CM    1  Left shoulder pain, unspecified chronicity M25 512    2  Impingement syndrome of left shoulder M75 42                   Subjective: Pt presents explaining that he feels that his shoulder has improved greatly since his first session  He notes that at this time he is comfortable in transitioning to an independent HEP  Objective: See treatment diary below      Assessment: Tolerated treatment well  Patient demonstrated fatigue post treatment and would benefit from continued PT  Pt has made improvements in strength, range of motion, pain control and towards a return to maximal level of function  Pt reports they have improved to 80% since beginning Physical Therapy  Pt educated on importance of continuing HEP, progressions of exercises and to contact the facility if there are any questions or concerns in the future  Pt was furnished with a black theraband to use with his HEP  Pt will be discharged from PT at this time  Plan: Continue per plan of care            Precautions: [place], COPD, Hx Angina, Type II DM, MI ()    Daily Treatment Diary       Manuals 11/21 11/25 12/4 12/10 12/16 12/26 1/3 1/8 1/15    PROM L Shoulder   RN [facility]     Inf G-H w/ abduction   Gr III RN Gr 3 [facility]     PA mobs w/ ER   Gr II+ RN Gr 3 [facility]     GH distraction w/ abduction             Exercise Diary              Shoulder blade squeezes 10x10'' 10x10"           TB Ext/Row 2x10 GTB 2x10 GTB 2x10 BTB 2x10 GTB  2x10 BLK  ea 2x10 BLK  ea 2x10  BLK  1x10 GRY  ea 2x10  BLK  ea    Supine cane flexion 10x10'' 10x10'' 10x10'' 10x10'' 10x10''        Supine Cane ER  10x10'' 10x10'' 10x10'' 10x10''        Supine Cane scaption                          Seated thoracic ext s' 5x15''            Doorway stretch elbow ext 5x15'' 5x15''   5x15'' 3-way  3x30"      TB no monies  2x10 RTB  2x10  GTB 2x10  BTB 2x10  BTB 2x10  BLK 2x10  BLK 2x10 BLK    TB Scaption  2x10  GTB 2x10 GTB 2x10 GTB   2x10 BTB 2x10 BTB 2x10 BLK    Supine TB horiz  abd   2x10 GTB 2x10 GTB 2x10 GTB 2x10 bTB 2x10 bTB 2x10   BLK 2x10 BLK    TB IR/ER   2x10 BTB/GTB 2x15 BTB/GTB 2x15 BTB 2x15 BTB IR  2x15 BTB IR  2x15 GRY  ER  2x10 2x10 ea    TB D1 Shoulder ext    2x10  GTB 2x10  BTB 2x10  BTB 2x10  BTB 2x10  BLK     Standing TB abd    2x10  GTB 2x10  BTB 2x10  BTB 2x10  BTB 2x10  BLK 2x10 BLK    Standing TB D2 Ext     2x10  GTB 2x10  GTB  2x10  BTB     Towel stretch IR/ER      5x15'' 5x15'' nv     90-90 Shoulder ER      2x10  RTB 2x10  RTB nv     Seated Shoulder ER      3x10  #3 3x10  #4 2x10  #5     Supine TB D2 Ext        2x10  GTB 2x10  BTB     Posterior capsule s'                                                                              Modalities

## 2021-02-05 ENCOUNTER — OFFICE VISIT (OUTPATIENT)
Dept: GASTROENTEROLOGY | Facility: CLINIC | Age: 86
End: 2021-02-05
Payer: MEDICARE

## 2021-02-05 VITALS
DIASTOLIC BLOOD PRESSURE: 66 MMHG | BODY MASS INDEX: 30.71 KG/M2 | WEIGHT: 202.6 LBS | HEIGHT: 68 IN | HEART RATE: 57 BPM | SYSTOLIC BLOOD PRESSURE: 120 MMHG

## 2021-02-05 DIAGNOSIS — K44.9 HIATAL HERNIA: ICD-10-CM

## 2021-02-05 DIAGNOSIS — D50.0 IRON DEFICIENCY ANEMIA DUE TO CHRONIC BLOOD LOSS: Primary | ICD-10-CM

## 2021-02-05 DIAGNOSIS — Z80.0 FAMILY HISTORY OF COLON CANCER: ICD-10-CM

## 2021-02-05 PROCEDURE — 99213 OFFICE O/P EST LOW 20 MIN: CPT | Performed by: INTERNAL MEDICINE

## 2021-02-05 NOTE — LETTER
February 5, 2021     DO Minnie Sow [address]    Patient: Denisha Richards   YOB: 1933   Date of Visit: 2/5/2021       Dear Dr Shiloh Griffin: Thank you for referring Shweta Cortes to me for evaluation  Below are my notes for this consultation  If you have questions, please do not hesitate to call me  I look forward to following your patient along with you  Sincerely,        Felecia Lloyd MD        CC: Nick Lloyd MD  2/5/2021  4:29 PM  Sign when Signing Visit  [id number] - Outpatient Follow-up Note  Denisha Richards 80 y o  male MRN: [id number]  Encounter: [id number]    ASSESSMENT AND PLAN:      1  Iron deficiency anemia due to chronic blood loss  2  Hiatal hernia   history of iron deficiency anemia related to large hiatal hernia with Kat Cluck erosions  Treated with IV iron in the past    CBC stable  Iron studies normal  -  Continue chronic PPI  -  Recheck CBC and iron panel 1 year    3  Family history of colon cancer   last colonoscopy June 2014  No plans to repeat secondary to age      Followup Appointment:  1 year  ______________________________________________________________________    Chief Complaint   Patient presents with    Follow-up anemia     HPI:   The patient returns today for follow-up  His history of iron deficiency anemia secondary to large hiatal hernia and Lito's erosions  His last EGD was June 2018  He received a course of IV iron by Dr Jamie Castaneda in 2018  Blood work from January 27th revealed a hemoglobin of 12 9 and hematocrit of 38 4  His hemoglobin was 13 3 December 2019  His ferritin was 210  His last ferritin was 122  He has been maintained chronically on pantoprazole  He has not required any iron supplementation  He occasionally will have some dysphagia but drinks a glass of water and in washes down      He denies any nausea /vomiting, odynophagia, early satiety, hematemesis, or coffee-ground emesis  His bowels are normal   He denies any melena or hematochezia  His weight is stable  Historical Information   Past Medical History:   Diagnosis Date    Anal fissure     Bilateral hearing loss     has h a  bilaterally    CAD (coronary artery disease)     Cataract of both eyes     Coronary artery disease 1/13/2020    [facility] [address] [facility]     Enlarged prostate     Gram negative sepsis ([facility] [address] ) 2006    HTN (hypertension)     Hx of bladder cancer     Hypercholesterolemia     Iron deficiency anemia     secondary to [facility] & Lito's Erosions s/p IV iron    Myocardial infarction [facility]) 2013    Wears glasses      Past Surgical History:   Procedure Laterality Date    ANAL FISSURECTOMY      BLADDER TUMOR EXCISION      CATARACT EXTRACTION, BILATERAL      CHOLECYSTECTOMY      COLONOSCOPY  06/13/2014    CORONARY ARTERY BYPASS GRAFT  1999    X 3 done @ [facility]    CYSTOSTOMY W/ BLADDER BIOPSY      X 2    EGD  06/04/2018    HERNIA REPAIR      OR XCAPSL CTRC RMVL INSJ IO LENS PROSTH W/O ECP Left 7/20/2016    Procedure: EXTRACTION EXTRACAPSULAR CATARACT PHACO INTRAOCULAR LENS (IOL); Surgeon: Damaris Dacosta MD;  Location:  MAIN OR;  Service: Ophthalmology    OR XCAPSL CTRC RMVL INSJ IO LENS PROSTH W/O ECP Right 7/6/2016    Procedure: EXTRACTION EXTRACAPSULAR CATARACT PHACO INTRAOCULAR LENS (IOL);   Surgeon: Damaris Dacosta MD;  [facility];  Service: Ophthalmology     Social History     Substance and Sexual Activity   Alcohol Use No     Social History     Substance and Sexual Activity   Drug Use No     Social History     Tobacco Use   Smoking Status Never Smoker   Smokeless Tobacco Never Used     Family History   Problem Relation Age of Onset    Cancer Mother     Ovarian cancer Mother    Steffi Hose Cancer Father     Lung cancer Father     Stomach cancer Sister     Colon cancer Brother     Esophageal cancer Brother          Current Outpatient Medications:     amLODIPine (NORVASC) 5 mg tablet    aspirin 81 MG tablet    glyBURIDE (DIABETA) 1 25 mg tablet    nitroglycerin (NITROSTAT) 0 4 mg SL tablet    pantoprazole (PROTONIX) 40 mg tablet    rosuvastatin (CRESTOR) 5 mg tablet    Specialty Vitamins Products (PROSTATE PO)    VITAMIN D, CHOLECALCIFEROL, PO    Saw [place] 450 MG CAPS  Allergies   Allergen Reactions    Morphine And Related Other (See Comments)     confused     Reviewed medications and allergies and updated as indicated    PHYSICAL EXAM:    Blood pressure 120/66, pulse 57, height 5' 8" (1 727 m), weight 91 9 kg (202 lb 9 6 oz)  Body mass index is 30 81 kg/m²  General Appearance: NAD, cooperative, alert  Eyes: Anicteric, PERRLA, EOMI  ENT:  Normocephalic, atraumatic, normal mucosa  Neck:  Supple, symmetrical, trachea midline  Resp:  Clear to auscultation bilaterally; no rales, rhonchi or wheezing; respirations unlabored   CV:  S1 S2, Regular rate and rhythm; no murmur, rub, or gallop  GI:  Soft, non-tender, non-distended; normal bowel sounds; no masses, no organomegaly   Rectal: Deferred  Musculoskeletal: No cyanosis, clubbing or edema  Normal ROM  Skin:  No jaundice, rashes, or lesions   Heme/Lymph: No palpable cervical lymphadenopathy  Psych: Normal affect, good eye contact  Neuro: No gross deficits, AAOx3    Lab Results:     WBC 7 3, hemoglobin 12 9, hematocrit 38 4, platelet count 555  Iron 44, TIBC 265, ferritin 210   TSH 2 35   Sodium 141, potassium 4 4, chloride 103, bicarb 28, BUN 20, creatinine 1 50, glucose 123    ALT 9, AST 15, alk-phos 75, total bilirubin less than 0 2  (1/27/21  Radiology Results:   No results found

## 2021-02-05 NOTE — PROGRESS NOTES
[id number] - Outpatient Follow-up Note  Juaquin Manzo 80 y o  male MRN: [id number]  Encounter: [id number]    ASSESSMENT AND PLAN:      1  Iron deficiency anemia due to chronic blood loss  2  Hiatal hernia   history of iron deficiency anemia related to large hiatal hernia with Amparo Brome erosions  Treated with IV iron in the past    CBC stable  Iron studies normal  -  Continue chronic PPI  -  Recheck CBC and iron panel 1 year    3  Family history of colon cancer   last colonoscopy June 2014  No plans to repeat secondary to age      Followup Appointment:  1 year  ______________________________________________________________________    Chief Complaint   Patient presents with    Follow-up anemia     HPI:   The patient returns today for follow-up  His history of iron deficiency anemia secondary to large hiatal hernia and Lito's erosions  His last EGD was June 2018  He received a course of IV iron by Dr Geno Hess in 2018  Blood work from January 27th revealed a hemoglobin of 12 9 and hematocrit of 38 4  His hemoglobin was 13 3 December 2019  His ferritin was 210  His last ferritin was 122  He has been maintained chronically on pantoprazole  He has not required any iron supplementation  He occasionally will have some dysphagia but drinks a glass of water and in washes down  He denies any nausea /vomiting, odynophagia, early satiety, hematemesis, or coffee-ground emesis  His bowels are normal   He denies any melena or hematochezia  His weight is stable      Historical Information   Past Medical History:   Diagnosis Date    Anal fissure     Bilateral hearing loss     has h a  bilaterally    CAD (coronary artery disease)     Cataract of both eyes     Coronary artery disease 1/13/2020    Diabetes mellitus ([address] )     Enlarged prostate     Gram negative sepsis ([facility] [address] ) 2006    HTN (hypertension)     Hx of bladder cancer     Hypercholesterolemia     Iron deficiency anemia     secondary to [facility] & Lito's Erosions s/p IV iron    Myocardial infarction [facility]) 2013    Wears glasses      Past Surgical History:   Procedure Laterality Date    ANAL FISSURECTOMY      BLADDER TUMOR EXCISION      CATARACT EXTRACTION, BILATERAL      CHOLECYSTECTOMY      COLONOSCOPY  06/13/2014    CORONARY ARTERY BYPASS GRAFT  1999    X 3 done @ [facility]    CYSTOSTOMY W/ BLADDER BIOPSY      X 2    EGD  06/04/2018    HERNIA REPAIR      NC XCAPSL CTRC RMVL INSJ IO LENS PROSTH W/O ECP Left 7/20/2016    Procedure: EXTRACTION EXTRACAPSULAR CATARACT PHACO INTRAOCULAR LENS (IOL); Surgeon: Alexandrea Steward MD;  [facility];  Service: Ophthalmology    NC XCAPSL CTRC RMVL INSJ IO LENS PROSTH W/O ECP Right 7/6/2016    Procedure: EXTRACTION EXTRACAPSULAR CATARACT PHACO INTRAOCULAR LENS (IOL);   Surgeon: Alexandrea Steward MD;  [facility];  Service: Ophthalmology     Social History     Substance and Sexual Activity   Alcohol Use No     Social History     Substance and Sexual Activity   Drug Use No     Social History     Tobacco Use   Smoking Status Never Smoker   Smokeless Tobacco Never Used     Family History   Problem Relation Age of Onset    Cancer Mother     Ovarian cancer Mother     Cancer Father     Lung cancer Father     Stomach cancer Sister     Colon cancer Brother     Esophageal cancer Brother          Current Outpatient Medications:     amLODIPine (NORVASC) 5 mg tablet    aspirin 81 MG tablet    glyBURIDE (DIABETA) 1 25 mg tablet    nitroglycerin (NITROSTAT) 0 4 mg SL tablet    pantoprazole (PROTONIX) 40 mg tablet    rosuvastatin (CRESTOR) 5 mg tablet    Specialty Vitamins Products (PROSTATE PO)    VITAMIN D, CHOLECALCIFEROL, PO    Saw [place] 450 MG CAPS  Allergies   Allergen Reactions    Morphine And Related Other (See Comments)     confused     Reviewed medications and allergies and updated as indicated    PHYSICAL EXAM:    Blood pressure 120/66, pulse 57, height 5' 8" (1 727 m), weight 91 9 kg (202 lb 9 6 oz)  Body mass index is 30 81 kg/m²  General Appearance: NAD, cooperative, alert  Eyes: Anicteric, PERRLA, EOMI  ENT:  Normocephalic, atraumatic, normal mucosa  Neck:  Supple, symmetrical, trachea midline  Resp:  Clear to auscultation bilaterally; no rales, rhonchi or wheezing; respirations unlabored   CV:  S1 S2, Regular rate and rhythm; no murmur, rub, or gallop  GI:  Soft, non-tender, non-distended; normal bowel sounds; no masses, no organomegaly   Rectal: Deferred  Musculoskeletal: No cyanosis, clubbing or edema  Normal ROM  Skin:  No jaundice, rashes, or lesions   Heme/Lymph: No palpable cervical lymphadenopathy  Psych: Normal affect, good eye contact  Neuro: No gross deficits, AAOx3    Lab Results:     WBC 7 3, hemoglobin 12 9, hematocrit 38 4, platelet count 054  Iron 44, TIBC 265, ferritin 210   TSH 2 35   Sodium 141, potassium 4 4, chloride 103, bicarb 28, BUN 20, creatinine 1 50, glucose 123    ALT 9, AST 15, alk-phos 75, total bilirubin less than 0 2  (1/27/21  Radiology Results:   No results found

## 2021-04-06 DIAGNOSIS — Z23 ENCOUNTER FOR IMMUNIZATION: ICD-10-CM

## 2021-04-14 ENCOUNTER — IMMUNIZATIONS (OUTPATIENT)
Dept: FAMILY MEDICINE CLINIC | Facility: HOSPITAL | Age: 86
End: 2021-04-14

## 2021-04-14 DIAGNOSIS — Z23 ENCOUNTER FOR IMMUNIZATION: Primary | ICD-10-CM

## 2021-04-14 PROCEDURE — 0001A SARS-COV-2 / COVID-19 MRNA VACCINE (PFIZER-BIONTECH) 30 MCG: CPT

## 2021-04-14 PROCEDURE — 91300 SARS-COV-2 / COVID-19 MRNA VACCINE (PFIZER-BIONTECH) 30 MCG: CPT

## 2021-05-08 ENCOUNTER — IMMUNIZATIONS (OUTPATIENT)
Dept: FAMILY MEDICINE CLINIC | Facility: HOSPITAL | Age: 86
End: 2021-05-08

## 2021-05-08 DIAGNOSIS — Z23 ENCOUNTER FOR IMMUNIZATION: Primary | ICD-10-CM

## 2021-05-08 PROCEDURE — 91300 SARS-COV-2 / COVID-19 MRNA VACCINE (PFIZER-BIONTECH) 30 MCG: CPT | Performed by: NURSE PRACTITIONER

## 2021-05-08 PROCEDURE — 0002A SARS-COV-2 / COVID-19 MRNA VACCINE (PFIZER-BIONTECH) 30 MCG: CPT | Performed by: NURSE PRACTITIONER

## 2021-12-13 ENCOUNTER — OFFICE VISIT (OUTPATIENT)
Dept: GASTROENTEROLOGY | Facility: CLINIC | Age: 86
End: 2021-12-13
Payer: MEDICARE

## 2021-12-13 VITALS
DIASTOLIC BLOOD PRESSURE: 74 MMHG | SYSTOLIC BLOOD PRESSURE: 128 MMHG | HEART RATE: 56 BPM | HEIGHT: 68 IN | WEIGHT: 204 LBS | BODY MASS INDEX: 30.92 KG/M2

## 2021-12-13 DIAGNOSIS — K44.9 HIATAL HERNIA: ICD-10-CM

## 2021-12-13 DIAGNOSIS — D50.0 IRON DEFICIENCY ANEMIA DUE TO CHRONIC BLOOD LOSS: Primary | ICD-10-CM

## 2021-12-13 DIAGNOSIS — R07.9 CHEST PAIN, UNSPECIFIED TYPE: ICD-10-CM

## 2021-12-13 PROCEDURE — 99214 OFFICE O/P EST MOD 30 MIN: CPT | Performed by: INTERNAL MEDICINE

## 2022-04-13 ENCOUNTER — HOSPITAL ENCOUNTER (EMERGENCY)
Facility: HOSPITAL | Age: 87
Discharge: HOME | End: 2022-04-13
Attending: EMERGENCY MEDICINE | Admitting: EMERGENCY MEDICINE
Payer: MEDICARE

## 2022-04-13 ENCOUNTER — APPOINTMENT (EMERGENCY)
Dept: RADIOLOGY | Facility: HOSPITAL | Age: 87
End: 2022-04-13
Payer: MEDICARE

## 2022-04-13 VITALS
WEIGHT: 204 LBS | TEMPERATURE: 98.3 F | BODY MASS INDEX: 30.21 KG/M2 | DIASTOLIC BLOOD PRESSURE: 70 MMHG | RESPIRATION RATE: 16 BRPM | HEART RATE: 61 BPM | SYSTOLIC BLOOD PRESSURE: 154 MMHG | OXYGEN SATURATION: 99 % | HEIGHT: 69 IN

## 2022-04-13 DIAGNOSIS — S43.402A SPRAIN OF LEFT SHOULDER: Primary | ICD-10-CM

## 2022-04-13 PROCEDURE — 99284 EMERGENCY DEPT VISIT MOD MDM: CPT

## 2022-04-13 PROCEDURE — 73030 X-RAY EXAM OF SHOULDER: CPT

## 2022-04-13 PROCEDURE — 93005 ELECTROCARDIOGRAM TRACING: CPT

## 2022-04-13 PROCEDURE — 99284 EMERGENCY DEPT VISIT MOD MDM: CPT | Performed by: EMERGENCY MEDICINE

## 2022-04-13 RX ORDER — ACETAMINOPHEN 325 MG/1
650 TABLET ORAL ONCE
Status: DISCONTINUED | OUTPATIENT
Start: 2022-04-13 | End: 2022-04-13 | Stop reason: HOSPADM

## 2022-04-14 LAB
ATRIAL RATE: 64 BPM
P AXIS: 88 DEGREES
PR INTERVAL: 240 MS
QRS AXIS: 69 DEGREES
QRSD INTERVAL: 118 MS
QT INTERVAL: 420 MS
QTC INTERVAL: 433 MS
T WAVE AXIS: 33 DEGREES
VENTRICULAR RATE: 64 BPM

## 2022-04-14 PROCEDURE — 93010 ELECTROCARDIOGRAM REPORT: CPT | Performed by: INTERNAL MEDICINE

## 2022-04-14 NOTE — ED PROVIDER NOTES
Emergency Department Trauma Note  Luana Singh 80 y o  male MRN: [id number]  Unit/Bed#: ED 06/ED 06 Encounter: [id number]      Trauma Alert: Trauma Acuity: Trauma Evaluation  Model of Arrival: Mode of Arrival: Direct from scene via    Trauma Team: Current Providers  Attending Provider: Jeanie Mclean DO  Registered Nurse: Ernie Jones RN  Consultants:     None      History of Present Illness     Chief Complaint:   Chief Complaint   Patient presents with    Shoulder Pain     Pt was walking into house and tripped, fell backwards into yard, left shoulder pain, deniesn LOC, +asa  HPI:  Luana Singh is a 80 y o  male who presents with isolated left shoulder injury after he fell he is not taking any blood thinners at this time     Mechanism:Details of Incident: Pt was walking into house and tripped, fell backwards into yard, left shoulder pain, deniesn LOC, +asa  Injury Date: 04/13/22 Injury Time: 1900      This is an 80-year-old female who states that he lost his footing and fell onto his left shoulder 1 hour prior to arrival   He states that he has not taking aspirin anymore he denies head strike no loss of consciousness no neck or back pain and no other areas of injury  [place] coma Scale is 15 he is not taking any blood thinners injury is isolated and trauma evaluation follow not clinically indicated  Denies any chest or abdominal pain      History provided by:  Patient  Medical Problem  Location: This is a 80-year-old male states he lost his footing and fell onto his left shoulder and grass 1 hour prior to arrival no other areas of injury  Quality:  Achy  Severity:  Mild  Onset quality:  Sudden  Duration:  1 hour  Timing:  Constant  Progression:  Unchanged  Chronicity:  New  Context:  Berl Mess to the left shoulder  Associated symptoms: no abdominal pain, no chest pain and no shortness of breath      Review of Systems   Respiratory: Negative for shortness of breath      Cardiovascular: Negative for chest pain  Gastrointestinal: Negative for abdominal pain  Musculoskeletal:        Left shoulder pain   All other systems reviewed and are negative  Historical Information     Immunizations:   Immunization History   Administered Date(s) Administered    COVID-19 PFIZER VACCINE 0 3 ML IM 04/14/2021, 05/08/2021       Past Medical History:   Diagnosis Date    Anal fissure     Bilateral hearing loss     has h a  bilaterally    CAD (coronary artery disease)     Cataract of both eyes     Coronary artery disease 1/13/2020    Diabetes mellitus ([facility] [address] )     Enlarged prostate     Gram negative sepsis ([facility] [address] ) 2006    HTN (hypertension)     Hx of bladder cancer     Hypercholesterolemia     Iron deficiency anemia     secondary to [facility] & Lito's Erosions s/p IV iron    Myocardial infarction [facility]) 2013    Wears glasses        Family History   Problem Relation Age of Onset    Cancer Mother     Ovarian cancer Mother     Cancer Father     Lung cancer Father     Stomach cancer Sister     Colon cancer Brother     Esophageal cancer Brother      Past Surgical History:   Procedure Laterality Date    ANAL FISSURECTOMY      BLADDER TUMOR EXCISION      CATARACT EXTRACTION, BILATERAL      CHOLECYSTECTOMY      COLONOSCOPY  06/13/2014    CORONARY ARTERY BYPASS GRAFT  1999    X 3 done @ [facility] [place]    CYSTOSTOMY W/ BLADDER BIOPSY      X 2    EGD  06/04/2018    HERNIA REPAIR      UT XCAPSL CTRC RMVL INSJ IO LENS PROSTH W/O ECP Left 7/20/2016    Procedure: EXTRACTION EXTRACAPSULAR CATARACT PHACO INTRAOCULAR LENS (IOL); Surgeon: Samantha Ivey MD;  [facility];  Service: Ophthalmology    UT XCAPSL CTRC RMVL INSJ IO LENS PROSTH W/O ECP Right 7/6/2016    Procedure: EXTRACTION EXTRACAPSULAR CATARACT PHACO INTRAOCULAR LENS (IOL);   Surgeon: Samantha Ivey MD;  [facility];  Service: Ophthalmology     Social History     Tobacco Use    Smoking status: Never Smoker    Smokeless tobacco: Never Used   Vaping Use    Vaping Use: Never used   Substance Use Topics    Alcohol use: No    Drug use: No     E-Cigarette/Vaping    E-Cigarette Use Never User      E-Cigarette/Vaping Substances       Family History: non-contributory    Meds/Allergies   Prior to Admission Medications   Prescriptions Last Dose Informant Patient Reported? Taking? Apoaequorin (PREVAGEN PO)  Self Yes No   Sig: Take by mouth   Cyanocobalamin (VITAMIN B 12 PO)  Self Yes No   Sig: Take by mouth   Saw [place] 450 MG CAPS  Self Yes No   Sig: Take 2 tablets by mouth 2 (two) times a day  Patient not taking: Reported on 12/13/2021    Specialty Vitamins Products (PROSTATE PO)  Self Yes No   Sig: Take by mouth   VITAMIN D, CHOLECALCIFEROL, PO  Self Yes No   Sig: Take 5,000 Int'l Units by mouth daily  amLODIPine (NORVASC) 5 mg tablet  Self Yes No   Sig: Take 5 mg by mouth daily  aspirin 81 MG tablet  Self Yes No   Sig: Take 81 mg by mouth 2 (two) times a day  glyBURIDE (DIABETA) 2 5 mg tablet  Self Yes No   Sig: Take 2 5 mg by mouth daily with dinner     nitroglycerin (NITROSTAT) 0 4 mg SL tablet  Self Yes No   Sig: Place 0 4 mg under the tongue every 5 (five) minutes as needed for chest pain  pantoprazole (PROTONIX) 40 mg tablet  Self Yes No   Sig: Take 40 mg by mouth daily   rosuvastatin (CRESTOR) 5 mg tablet  Self Yes No   Sig: Take 2 5 mg by mouth daily   Takes 5x weekly        Facility-Administered Medications: None       Allergies   Allergen Reactions    Morphine And Related Other (See Comments)     confused    Simvastatin Other (See Comments)     muscle ache and knee pain,all statins       PHYSICAL EXAM    PE limited by:  Nothing    Objective   Vitals:   First set: Temperature: 98 3 °F (36 8 °C) (04/13/22 2003)  Pulse: 68 (04/13/22 2003)  Respirations: 16 (04/13/22 2003)  Blood Pressure: (!) 178/77 (04/13/22 2003)  SpO2: 98 % (04/13/22 2003)    Primary Survey:   (A) Airway:  Patent  (B) Breathing:  Clear bilaterally  (C) Circulation: Pulses:   normal  (D) Disabliity:  GCS Total:  15  (E) Expose:  Completed    Secondary Survey: (Click on Physical Exam tab above)  Physical Exam  Vitals and nursing note reviewed  Constitutional:       General: He is not in acute distress  Appearance: He is not ill-appearing, toxic-appearing or diaphoretic  HENT:      Head: Normocephalic and atraumatic  Right Ear: Tympanic membrane, ear canal and external ear normal       Left Ear: Tympanic membrane, ear canal and external ear normal       Nose: Nose normal    Eyes:      General: No scleral icterus  Right eye: No discharge  Left eye: No discharge  Extraocular Movements: Extraocular movements intact  Pupils: Pupils are equal, round, and reactive to light  Neck:      Comments: The patient has none of the followin  Focal neurologic deficit  2  Midline spinal tenderness  3  AMS  4  Intoxication  5  Distracting injury  The C-Spine can be cleared clinically by these criteria; imaging is not required  There is also no thoracic or lumbar midline spinal tenderness    Cardiovascular:      Rate and Rhythm: Normal rate and regular rhythm  Pulses: Normal pulses  Heart sounds: Normal heart sounds  No murmur heard  No friction rub  No gallop  Pulmonary:      Effort: Pulmonary effort is normal  No respiratory distress  Breath sounds: Normal breath sounds  No stridor  No wheezing, rhonchi or rales  Abdominal:      General: There is no distension  Palpations: Abdomen is soft  Tenderness: There is no abdominal tenderness  There is no guarding or rebound  Musculoskeletal:         General: Tenderness and signs of injury present  No swelling or deformity  Normal range of motion  Cervical back: Normal range of motion and neck supple  No rigidity or tenderness  Right lower leg: No edema  Left lower leg: No edema        Comments: Left shoulder lateral tenderness Skin:     General: Skin is warm and dry  Coloration: Skin is not jaundiced  Findings: No bruising, erythema or rash  Neurological:      General: No focal deficit present  Mental Status: He is alert  Psychiatric:         Mood and Affect: Mood normal          Behavior: Behavior normal          Thought Content: Thought content normal          Cervical spine cleared by clinical criteria? Yes     Invasive Devices  Report    None                 Lab Results:   Results Reviewed     None                 Imaging Studies:   Direct to CT: No  XR shoulder 2+ views LEFT   ED Interpretation by Janna Chen DO (04/13 2046)   No acute fracture or dislocation            Procedures  Procedures         ED Course           MDM        Disposition  [facility] Transfer: No  Final diagnoses:   Sprain of left shoulder     Time reflects when diagnosis was documented in both MDM as applicable and the Disposition within this note     Time User Action Codes Description Comment    4/13/2022  8:47 PM Krystal Mayfield Add [[id number]] Sprain of left shoulder       ED Disposition     ED Disposition Condition Date/Time Comment    Discharge Stable Wed Apr 13, 2022  8:47 PM Rosa Isela Garcia discharge to home/self care  Follow-up Information     Follow up With Specialties Details Why Contact Info Additional Information    Oneal Cadet DO Family Medicine   [address] Emergency Department Emergency Medicine  As needed, If symptoms worsen [address] [place] [address]  [address] [facility] Emergency Department, [address]        Patient's Medications   Discharge Prescriptions    No medications on file     No discharge procedures on file      PDMP Review     None          ED Provider  Electronically Signed by         Janna Chen DO  04/13/22 2047

## 2022-09-02 ENCOUNTER — HOSPITAL ENCOUNTER (EMERGENCY)
Facility: HOSPITAL | Age: 87
Discharge: HOME | End: 2022-09-02
Attending: EMERGENCY MEDICINE
Payer: MEDICARE

## 2022-09-02 VITALS
HEART RATE: 61 BPM | SYSTOLIC BLOOD PRESSURE: 124 MMHG | DIASTOLIC BLOOD PRESSURE: 60 MMHG | OXYGEN SATURATION: 98 % | TEMPERATURE: 98.9 F | RESPIRATION RATE: 18 BRPM

## 2022-09-02 DIAGNOSIS — H60.12 CELLULITIS OF LEFT EAR: Primary | ICD-10-CM

## 2022-09-02 LAB
ALBUMIN SERPL BCP-MCNC: 3.5 G/DL (ref 3.5–5)
ALP SERPL-CCNC: 77 U/L (ref 46–116)
ALT SERPL W P-5'-P-CCNC: 18 U/L (ref 12–78)
ANION GAP SERPL CALCULATED.3IONS-SCNC: 7 MMOL/L (ref 4–13)
AST SERPL W P-5'-P-CCNC: 16 U/L (ref 5–45)
ATRIAL RATE: 60 BPM
BASOPHILS # BLD AUTO: 0.01 THOUSANDS/ΜL (ref 0–0.1)
BASOPHILS NFR BLD AUTO: 0 % (ref 0–1)
BILIRUB SERPL-MCNC: 0.8 MG/DL (ref 0.2–1)
BUN SERPL-MCNC: 24 MG/DL (ref 5–25)
CALCIUM SERPL-MCNC: 8.7 MG/DL (ref 8.3–10.1)
CHLORIDE SERPL-SCNC: 103 MMOL/L (ref 96–108)
CO2 SERPL-SCNC: 25 MMOL/L (ref 21–32)
CREAT SERPL-MCNC: 2.09 MG/DL (ref 0.6–1.3)
EOSINOPHIL # BLD AUTO: 0.06 THOUSAND/ΜL (ref 0–0.61)
EOSINOPHIL NFR BLD AUTO: 1 % (ref 0–6)
ERYTHROCYTE [DISTWIDTH] IN BLOOD BY AUTOMATED COUNT: 12.8 % (ref 11.6–15.1)
GFR SERPL CREATININE-BSD FRML MDRD: 27 ML/MIN/1.73SQ M
GLUCOSE SERPL-MCNC: 143 MG/DL (ref 65–140)
HCT VFR BLD AUTO: 36.6 % (ref 36.5–49.3)
HGB BLD-MCNC: 12.5 G/DL (ref 12–17)
IMM GRANULOCYTES # BLD AUTO: 0.02 THOUSAND/UL (ref 0–0.2)
IMM GRANULOCYTES NFR BLD AUTO: 0 % (ref 0–2)
LACTATE SERPL-SCNC: 0.4 MMOL/L (ref 0.5–2)
LYMPHOCYTES # BLD AUTO: 0.96 THOUSANDS/ΜL (ref 0.6–4.47)
LYMPHOCYTES NFR BLD AUTO: 13 % (ref 14–44)
MCH RBC QN AUTO: 31.7 PG (ref 26.8–34.3)
MCHC RBC AUTO-ENTMCNC: 34.2 G/DL (ref 31.4–37.4)
MCV RBC AUTO: 93 FL (ref 82–98)
MONOCYTES # BLD AUTO: 0.77 THOUSAND/ΜL (ref 0.17–1.22)
MONOCYTES NFR BLD AUTO: 11 % (ref 4–12)
NEUTROPHILS # BLD AUTO: 5.5 THOUSANDS/ΜL (ref 1.85–7.62)
NEUTS SEG NFR BLD AUTO: 75 % (ref 43–75)
NRBC BLD AUTO-RTO: 0 /100 WBCS
PLATELET # BLD AUTO: 144 THOUSANDS/UL (ref 149–390)
PMV BLD AUTO: 9.8 FL (ref 8.9–12.7)
POTASSIUM SERPL-SCNC: 3.9 MMOL/L (ref 3.5–5.3)
PROCALCITONIN SERPL-MCNC: 0.18 NG/ML
PROT SERPL-MCNC: 6.8 G/DL (ref 6.4–8.4)
QRS AXIS: 65 DEGREES
QRSD INTERVAL: 118 MS
QT INTERVAL: 424 MS
QTC INTERVAL: 426 MS
RBC # BLD AUTO: 3.94 MILLION/UL (ref 3.88–5.62)
SODIUM SERPL-SCNC: 135 MMOL/L (ref 135–147)
T WAVE AXIS: 38 DEGREES
VENTRICULAR RATE: 61 BPM
WBC # BLD AUTO: 7.32 THOUSAND/UL (ref 4.31–10.16)

## 2022-09-02 PROCEDURE — 83605 ASSAY OF LACTIC ACID: CPT | Performed by: EMERGENCY MEDICINE

## 2022-09-02 PROCEDURE — 80053 COMPREHEN METABOLIC PANEL: CPT | Performed by: EMERGENCY MEDICINE

## 2022-09-02 PROCEDURE — 96365 THER/PROPH/DIAG IV INF INIT: CPT

## 2022-09-02 PROCEDURE — 93010 ELECTROCARDIOGRAM REPORT: CPT | Performed by: INTERNAL MEDICINE

## 2022-09-02 PROCEDURE — 87040 BLOOD CULTURE FOR BACTERIA: CPT | Performed by: EMERGENCY MEDICINE

## 2022-09-02 PROCEDURE — 99283 EMERGENCY DEPT VISIT LOW MDM: CPT

## 2022-09-02 PROCEDURE — 93005 ELECTROCARDIOGRAM TRACING: CPT

## 2022-09-02 PROCEDURE — 36415 COLL VENOUS BLD VENIPUNCTURE: CPT | Performed by: EMERGENCY MEDICINE

## 2022-09-02 PROCEDURE — 85025 COMPLETE CBC W/AUTO DIFF WBC: CPT | Performed by: EMERGENCY MEDICINE

## 2022-09-02 PROCEDURE — 84145 PROCALCITONIN (PCT): CPT | Performed by: EMERGENCY MEDICINE

## 2022-09-02 PROCEDURE — 99285 EMERGENCY DEPT VISIT HI MDM: CPT | Performed by: EMERGENCY MEDICINE

## 2022-09-02 RX ORDER — CEPHALEXIN 500 MG/1
500 CAPSULE ORAL EVERY 8 HOURS SCHEDULED
Qty: 21 CAPSULE | Refills: 0 | Status: SHIPPED | OUTPATIENT
Start: 2022-09-02 | End: 2022-09-09

## 2022-09-02 RX ORDER — CEFEPIME HYDROCHLORIDE 2 G/50ML
2000 INJECTION, SOLUTION INTRAVENOUS ONCE
Status: COMPLETED | OUTPATIENT
Start: 2022-09-02 | End: 2022-09-02

## 2022-09-02 RX ADMIN — CEFEPIME HYDROCHLORIDE 2000 MG: 2 INJECTION, SOLUTION INTRAVENOUS at 14:17

## 2022-09-02 NOTE — DISCHARGE INSTRUCTIONS
Warm compresses for 10 - 15 min every time you take your antibiotic  Please follow up with family doctor ASAP or return here to ER with worsening symptoms    Your kidney function was checked and 2 0 - previous labs here from 2016 were 1 3 - please address this with your primary doctor

## 2022-09-02 NOTE — ED PROVIDER NOTES
History  Chief Complaint   Patient presents with    Ear Swelling     Pt to er with complaints of left ear redness and swelling that started 2 days ago  Patient states that he has a scab on the back for a "long time" and has an appt to get it removed  81 yo male who had a scab on the back of his L ear a few days ago and now entire L ear swollen and red, redness now spreading to L cheek  History provided by:  Patient   used: No    Earache  Location:  Left  Behind ear:  Redness and swelling  Quality:  Aching and dull  Severity:  Moderate  Onset quality:  Gradual  Timing:  Constant  Progression:  Worsening  Chronicity:  New  Relieved by:  Nothing  Worsened by:  Nothing  Ineffective treatments:  None tried  Associated symptoms: no abdominal pain, no congestion, no diarrhea, no ear discharge, no fever, no headaches, no neck pain, no rash, no sore throat and no vomiting        Prior to Admission Medications   Prescriptions Last Dose Informant Patient Reported? Taking? Apoaequorin (PREVAGEN PO)  Self Yes No   Sig: Take by mouth   Cyanocobalamin (VITAMIN B 12 PO)  Self Yes No   Sig: Take by mouth   Saw Palmetto 450 MG CAPS  Self Yes No   Sig: Take 2 tablets by mouth 2 (two) times a day  Patient not taking: Reported on 12/13/2021    Specialty Vitamins Products (PROSTATE PO)  Self Yes No   Sig: Take by mouth   VITAMIN D, CHOLECALCIFEROL, PO  Self Yes No   Sig: Take 5,000 Int'l Units by mouth daily  amLODIPine (NORVASC) 5 mg tablet  Self Yes No   Sig: Take 5 mg by mouth daily  aspirin 81 MG tablet  Self Yes No   Sig: Take 81 mg by mouth 2 (two) times a day  glyBURIDE (DIABETA) 2 5 mg tablet  Self Yes No   Sig: Take 2 5 mg by mouth daily with dinner     nitroglycerin (NITROSTAT) 0 4 mg SL tablet  Self Yes No   Sig: Place 0 4 mg under the tongue every 5 (five) minutes as needed for chest pain     pantoprazole (PROTONIX) 40 mg tablet  Self Yes No   Sig: Take 40 mg by mouth daily rosuvastatin (CRESTOR) 5 mg tablet  Self Yes No   Sig: Take 2 5 mg by mouth daily  Takes 5x weekly        Facility-Administered Medications: None       Past Medical History:   Diagnosis Date    Anal fissure     Bilateral hearing loss     has h a  bilaterally    CAD (coronary artery disease)     Cataract of both eyes     Coronary artery disease 1/13/2020    Diabetes mellitus ([facility] [address] [facility]     Enlarged prostate     Gram negative sepsis ([facility] [address] ) 2006    HTN (hypertension)     Hx of bladder cancer     Hypercholesterolemia     Iron deficiency anemia     secondary to [facility] s/p IV iron    Myocardial infarction [facility]) 2013    Wears glasses        Past Surgical History:   Procedure Laterality Date    ANAL FISSURECTOMY      BLADDER TUMOR EXCISION      CATARACT EXTRACTION, BILATERAL      CHOLECYSTECTOMY      COLONOSCOPY  06/13/2014    CORONARY ARTERY BYPASS GRAFT  1999    X 3 done @ [facility]    CYSTOSTOMY W/ BLADDER BIOPSY      X 2    EGD  06/04/2018    HERNIA REPAIR      FL XCAPSL CTRC RMVL INSJ IO LENS PROSTH W/O ECP Left 7/20/2016    Procedure: EXTRACTION EXTRACAPSULAR CATARACT PHACO INTRAOCULAR LENS (IOL); Surgeon: Jasper Macias MD;  [facility];  Service: Ophthalmology    FL XCAPSL CTRC RMVL INSJ IO LENS PROSTH W/O ECP Right 7/6/2016    Procedure: EXTRACTION EXTRACAPSULAR CATARACT PHACO INTRAOCULAR LENS (IOL); Surgeon: Jasper Macias MD;  [facility];  Service: Ophthalmology       Family History   Problem Relation Age of Onset    Cancer Mother     Ovarian cancer Mother     Cancer Father     Lung cancer Father     Stomach cancer Sister     Colon cancer Brother     Esophageal cancer Brother      I have reviewed and agree with the history as documented      E-Cigarette/Vaping    E-Cigarette Use Never User      E-Cigarette/Vaping Substances     Social History     Tobacco Use    Smoking status: Never Smoker    Smokeless tobacco: Never Used   Vaping Use  Vaping Use: Never used   Substance Use Topics    Alcohol use: No    Drug use: No       Review of Systems   Constitutional: Negative for chills and fever  HENT: Positive for ear pain and facial swelling (mild L side face)  Negative for congestion, ear discharge and sore throat  Eyes: Negative for visual disturbance  Respiratory: Negative for shortness of breath and wheezing  Cardiovascular: Negative for chest pain and palpitations  Gastrointestinal: Negative for abdominal pain, diarrhea, nausea and vomiting  Genitourinary: Negative for dysuria  Musculoskeletal: Negative for neck pain and neck stiffness  Skin: Negative for pallor and rash  Neurological: Negative for headaches  Psychiatric/Behavioral: Negative for confusion  All other systems reviewed and are negative  Physical Exam  Physical Exam  Vitals and nursing note reviewed  Constitutional:       Appearance: He is well-developed  HENT:      Head:      Comments: L ear swollen, erythematous - erythema and swelling now spreading on L maxilla - findings c/w cellulitis, area post back of L ear with excoriated area from where "scab" was he picked off     Mouth/Throat:      Mouth: Mucous membranes are moist    Eyes:      Conjunctiva/sclera: Conjunctivae normal    Cardiovascular:      Rate and Rhythm: Normal rate and regular rhythm  Heart sounds: No murmur heard  Pulmonary:      Effort: Pulmonary effort is normal  No respiratory distress  Breath sounds: Normal breath sounds  Abdominal:      Palpations: Abdomen is soft  Tenderness: There is no abdominal tenderness  Musculoskeletal:      Cervical back: Neck supple  Skin:     General: Skin is warm and dry  Neurological:      Mental Status: He is alert and oriented to person, place, and time     Psychiatric:         Behavior: Behavior normal          Vital Signs  ED Triage Vitals   Temperature Pulse Respirations Blood Pressure SpO2   09/02/22 1433 09/02/22 1332 09/02/22 1332 09/02/22 1332 09/02/22 1332   98 9 °F (37 2 °C) 61 18 124/60 98 %      Temp Source Heart Rate Source Patient Position - Orthostatic VS BP Location FiO2 (%)   09/02/22 1433 09/02/22 1332 09/02/22 1332 09/02/22 1332 --   Temporal Monitor Sitting Left arm       Pain Score       --                  Vitals:    09/02/22 1332   BP: 124/60   Pulse: 61   Patient Position - Orthostatic VS: Sitting         Visual Acuity      ED Medications  Medications   cefepime (MAXIPIME) IVPB (premix in dextrose) 2,000 mg 50 mL (0 mg Intravenous Stopped 9/2/22 1449)       Diagnostic Studies  Results Reviewed     Procedure Component Value Units Date/Time    Procalcitonin [[id number]]  (Normal) Collected: 09/02/22 1342    Lab Status: Final result Specimen: Blood from Arm, Right Updated: 09/02/22 1454     Procalcitonin 0 18 ng/ml     Lactic acid [[id number]]  (Abnormal) Collected: 09/02/22 1342    Lab Status: Final result Specimen: Blood from Arm, Right Updated: 09/02/22 1419     LACTIC ACID 0 4 mmol/L     Narrative:      Result may be elevated if tourniquet was used during collection      Comprehensive metabolic panel [[id number]]  (Abnormal) Collected: 09/02/22 1342    Lab Status: Final result Specimen: Blood from Arm, Right Updated: 09/02/22 1416     Sodium 135 mmol/L      Potassium 3 9 mmol/L      Chloride 103 mmol/L      CO2 25 mmol/L      ANION GAP 7 mmol/L      BUN 24 mg/dL      Creatinine 2 09 mg/dL      Glucose 143 mg/dL      Calcium 8 7 mg/dL      AST 16 U/L      ALT 18 U/L      Alkaline Phosphatase 77 U/L      Total Protein 6 8 g/dL      Albumin 3 5 g/dL      Total Bilirubin 0 80 mg/dL      eGFR 27 ml/min/1 73sq m     Narrative:      [place] guidelines for Chronic Kidney Disease (CKD):     Stage 1 with normal or high GFR (GFR > 90 mL/min/1 73 square meters)    Stage 2 Mild CKD (GFR = 60-89 mL/min/1 73 square meters)    Stage 3A Moderate CKD (GFR = 45-59 mL/min/1 73 square meters)    Stage 3B Moderate CKD (GFR = 30-44 mL/min/1 73 square meters)    Stage 4 Severe CKD (GFR = 15-29 mL/min/1 73 square meters)    Stage 5 End Stage CKD (GFR <15 mL/min/1 73 square meters)  Note: GFR calculation is accurate only with a steady state creatinine    CBC and differential [[id number]]  (Abnormal) Collected: 09/02/22 1342    Lab Status: Final result Specimen: Blood from Arm, Right Updated: 09/02/22 1357     WBC 7 32 Thousand/uL      RBC 3 94 Million/uL      Hemoglobin 12 5 g/dL      Hematocrit 36 6 %      MCV 93 fL      MCH 31 7 pg      MCHC 34 2 g/dL      RDW 12 8 %      MPV 9 8 fL      Platelets 918 Thousands/uL      nRBC 0 /100 WBCs      Neutrophils Relative 75 %      Immat GRANS % 0 %      Lymphocytes Relative 13 %      Monocytes Relative 11 %      Eosinophils Relative 1 %      Basophils Relative 0 %      Neutrophils Absolute 5 50 Thousands/µL      Immature Grans Absolute 0 02 Thousand/uL      Lymphocytes Absolute 0 96 Thousands/µL      Monocytes Absolute 0 77 Thousand/µL      Eosinophils Absolute 0 06 Thousand/µL      Basophils Absolute 0 01 Thousands/µL     Blood culture #1 [[id number]] Collected: 09/02/22 1342    Lab Status: In process Specimen: Blood from Arm, Right Updated: 09/02/22 1349    Blood culture #2 [[id number]] Collected: 09/02/22 1342    Lab Status: In process Specimen: Blood from Arm, Right Updated: 09/02/22 1349                 No orders to display              Procedures  ECG 12 Lead Documentation Only    Date/Time: 9/2/2022 2:00 PM  Performed by: Eufemia Orantes DO  Authorized by:  Eufemia Orantes DO     Interpretation:     Interpretation: normal    Rate:     ECG rate:  61    ECG rate assessment: normal    Rhythm:     Rhythm: sinus rhythm    Ectopy:     Ectopy: none    QRS:     QRS axis:  Normal (RBBB)    QRS intervals:  Normal  Conduction:     Conduction: normal    ST segments:     ST segments:  Normal  T waves:     T waves: normal               ED Course  ED Course as of 09/02/22 5858 Fri Sep 02, 2022   1328 Pt seen and examined  81 yo male who had a scab on the back of his L ear a few days ago and now entire L ear swollen and red, redness now spreading to L cheek  Will check labs including blood cx, lactate and procalcitonin  1358 WBC 7 3   1416 Creat 2 09  Pt called me in to tell me he has a blind son at home and no one else can take care of him, refuses admission and can't stay long  IV cefepime infusing  SBIRT 22yo+    Flowsheet Row Most Recent Value   SBIRT (25 yo +)    In order to provide better care to our patients, we are screening all of our patients for alcohol and drug use  Would it be okay to ask you these screening questions? Yes Filed at: 09/02/2022 1353   Initial Alcohol Screen: US AUDIT-C     1  How often do you have a drink containing alcohol? 0 Filed at: 09/02/2022 1353   2  How many drinks containing alcohol do you have on a typical day you are drinking? 0 Filed at: 09/02/2022 1353   3a  Male UNDER 65: How often do you have five or more drinks on one occasion? 0 Filed at: 09/02/2022 1353   3b  FEMALE Any Age, or MALE 65+: How often do you have 4 or more drinks on one occassion? 0 Filed at: 09/02/2022 1353   Audit-C Score 0 Filed at: 09/02/2022 1353   LORENA: How many times in the past year have you    Used an illegal drug or used a prescription medication for non-medical reasons? Never Filed at: 09/02/2022 1353                    MDM    Disposition  Final diagnoses:   Cellulitis of left ear     Time reflects when diagnosis was documented in both MDM as applicable and the Disposition within this note     Time User Action Codes Description Comment    9/2/2022  2:26 PM Marcelo Just Add [H60 12] Cellulitis of left ear       ED Disposition     ED Disposition   Discharge    Condition   Stable    Date/Time   Fri Sep 2, 2022  2:25 PM    Comment   Joseph Garcia discharge to home/self care                 Follow-up Information     Follow up With Specialties Details [facility] In 3 days will need creat rechecked in near future Kody [address]            Discharge Medication List as of 9/2/2022  2:37 PM      START taking these medications    Details   cephalexin (KEFLEX) 500 mg capsule Take 1 capsule (500 mg total) by mouth every 8 (eight) hours for 7 days, Starting Fri 9/2/2022, Until Fri 9/9/2022, Normal         CONTINUE these medications which have NOT CHANGED    Details   amLODIPine (NORVASC) 5 mg tablet Take 5 mg by mouth daily  , Historical Med      Apoaequorin (PREVAGEN PO) Take by mouth, Historical Med      aspirin 81 MG tablet Take 81 mg by mouth 2 (two) times a day , Historical Med      Cyanocobalamin (VITAMIN B 12 PO) Take by mouth, Historical Med      glyBURIDE (DIABETA) 2 5 mg tablet Take 2 5 mg by mouth daily with dinner  , Historical Med      nitroglycerin (NITROSTAT) 0 4 mg SL tablet Place 0 4 mg under the tongue every 5 (five) minutes as needed for chest pain , Historical Med      pantoprazole (PROTONIX) 40 mg tablet Take 40 mg by mouth daily, Historical Med      rosuvastatin (CRESTOR) 5 mg tablet Take 2 5 mg by mouth daily  Takes 5x weekly  , Historical Med      Saw Brandon 450 MG CAPS Take 2 tablets by mouth 2 (two) times a day   , Historical Med      Specialty Vitamins Products (PROSTATE PO) Take by mouth, Historical Med      VITAMIN D, CHOLECALCIFEROL, PO Take 5,000 Int'l Units by mouth daily  , Historical Med             No discharge procedures on file      PDMP Review     None          ED Provider  Electronically Signed by           Le Espana DO  09/02/22 6971

## 2022-09-05 LAB
BACTERIA BLD CULT: NORMAL
BACTERIA BLD CULT: NORMAL

## 2022-09-08 LAB
BACTERIA BLD CULT: NORMAL
BACTERIA BLD CULT: NORMAL

## 2023-03-21 ENCOUNTER — HOSPITAL ENCOUNTER (EMERGENCY)
Facility: HOSPITAL | Age: 88
Discharge: HOME | End: 2023-03-21
Attending: EMERGENCY MEDICINE

## 2023-03-21 ENCOUNTER — APPOINTMENT (EMERGENCY)
Dept: RADIOLOGY | Facility: HOSPITAL | Age: 88
End: 2023-03-21
Attending: EMERGENCY MEDICINE

## 2023-03-21 VITALS
WEIGHT: 204 LBS | TEMPERATURE: 97.8 F | BODY MASS INDEX: 30.21 KG/M2 | OXYGEN SATURATION: 98 % | HEART RATE: 61 BPM | SYSTOLIC BLOOD PRESSURE: 123 MMHG | RESPIRATION RATE: 18 BRPM | HEIGHT: 69 IN | DIASTOLIC BLOOD PRESSURE: 65 MMHG

## 2023-03-21 DIAGNOSIS — M79.604 RIGHT LEG PAIN: Primary | ICD-10-CM

## 2023-03-21 DIAGNOSIS — T14.8XXA CONTUSION: ICD-10-CM

## 2023-03-21 RX ORDER — ACETAMINOPHEN 325 MG/1
975 TABLET ORAL ONCE
Status: COMPLETED | OUTPATIENT
Start: 2023-03-21 | End: 2023-03-21

## 2023-03-21 RX ADMIN — ACETAMINOPHEN 975 MG: 325 TABLET, FILM COATED ORAL at 03:09

## 2023-03-21 NOTE — ED PROVIDER NOTES
History  Chief Complaint   Patient presents with   • Leg Pain     Pt has a bump on his right shin  43-year-old male past medical history of hypertension, diabetes, CAD presents  for evaluation of right shin pain and small raised area that he noted this evening  Does not remember specific trauma, no chest pain or shortness of breath  No similar pain in the past   History of DVT or PE, no  DVT or PE risk factors          Prior to Admission Medications   Prescriptions Last Dose Informant Patient Reported? Taking? Apoaequorin (PREVAGEN PO)   Yes No   Sig: Take by mouth   Cyanocobalamin (VITAMIN B 12 PO)   Yes No   Sig: Take by mouth   Saw Palmetto 450 MG CAPS   Yes No   Sig: Take 2 tablets by mouth 2 (two) times a day  Patient not taking: Reported on 12/13/2021    Specialty Vitamins Products (PROSTATE PO)   Yes No   Sig: Take by mouth   VITAMIN D, CHOLECALCIFEROL, PO   Yes No   Sig: Take 5,000 Int'l Units by mouth daily  amLODIPine (NORVASC) 5 mg tablet   Yes No   Sig: Take 5 mg by mouth daily  aspirin 81 MG tablet   Yes No   Sig: Take 81 mg by mouth 2 (two) times a day  glyBURIDE (DIABETA) 2 5 mg tablet   Yes No   Sig: Take 2 5 mg by mouth daily with dinner     nitroglycerin (NITROSTAT) 0 4 mg SL tablet   Yes No   Sig: Place 0 4 mg under the tongue every 5 (five) minutes as needed for chest pain  pantoprazole (PROTONIX) 40 mg tablet   Yes No   Sig: Take 40 mg by mouth daily   rosuvastatin (CRESTOR) 5 mg tablet   Yes No   Sig: Take 2 5 mg by mouth daily   Takes 5x weekly        Facility-Administered Medications: None       Past Medical History:   Diagnosis Date   • Anal fissure    • Bilateral hearing loss     has h a  bilaterally   • CAD (coronary artery disease)    • Cataract of both eyes    • Coronary artery disease 1/13/2020   • Diabetes mellitus ([facility] [address] )    • Enlarged prostate    • Gram negative sepsis ([facility] [address] ) 2006   • HTN (hypertension)    • Hx of bladder cancer    • Hypercholesterolemia    • Iron deficiency anemia     secondary to [facility] s/p IV iron   • Myocardial infarction [facility]) 2013   • Wears glasses        Past Surgical History:   Procedure Laterality Date   • ANAL FISSURECTOMY     • BLADDER TUMOR EXCISION     • CATARACT EXTRACTION, BILATERAL     • CHOLECYSTECTOMY     • COLONOSCOPY  06/13/2014   • CORONARY ARTERY BYPASS GRAFT  1999    X 3 done @ [facility]   • CYSTOSTOMY W/ BLADDER BIOPSY      X 2   • EGD  06/04/2018   • HERNIA REPAIR     • CA XCAPSL CTRC RMVL INSJ IO LENS PROSTH W/O ECP Left 7/20/2016    Procedure: EXTRACTION EXTRACAPSULAR CATARACT PHACO INTRAOCULAR LENS (IOL); Surgeon: Saurav Barakat MD;  [facility];  Service: Ophthalmology   • CA XCAPSL CTRC RMVL INSJ IO LENS PROSTH W/O ECP Right 7/6/2016    Procedure: EXTRACTION EXTRACAPSULAR CATARACT PHACO INTRAOCULAR LENS (IOL); Surgeon: Saurav Barakat MD;  [facility];  Service: Ophthalmology       Family History   Problem Relation Age of Onset   • Cancer Mother    • Ovarian cancer Mother    • Cancer Father    • Lung cancer Father    • Stomach cancer Sister    • Colon cancer Brother    • Esophageal cancer Brother      I have reviewed and agree with the history as documented  E-Cigarette/Vaping   • E-Cigarette Use Never User      E-Cigarette/Vaping Substances     Social History     Tobacco Use   • Smoking status: Never   • Smokeless tobacco: Never   Vaping Use   • Vaping Use: Never used   Substance Use Topics   • Alcohol use: No   • Drug use: No       Review of Systems   Musculoskeletal:        Right leg pain       Physical Exam  Physical Exam  Musculoskeletal:      Right lower leg: No edema  Left lower leg: No edema          Legs:       Comments: Pedal pulses symmetrical, le well perfused         Vital Signs  ED Triage Vitals [03/21/23 0117]   Temperature Pulse Respirations Blood Pressure SpO2   97 8 °F (36 6 °C) 61 18 123/65 98 %      Temp Source Heart Rate Source Patient Position - Orthostatic VS BP Location FiO2 (%)   Oral -- Sitting Right arm --      Pain Score       No Pain           Vitals:    03/21/23 0117   BP: 123/65   Pulse: 61   Patient Position - Orthostatic VS: Sitting         Visual Acuity      ED Medications  Medications   acetaminophen (TYLENOL) tablet 975 mg (975 mg Oral Given 3/21/23 0309)       Diagnostic Studies  Results Reviewed     None                 XR tibia fibula 2 views RIGHT   ED Interpretation by Renan Asencio DO (03/21 2450)   This study was ordered and independently reviewed by me    No acute findings noted         VAS lower limb venous duplex study, unilateral/limited    (Results Pending)              Procedures  Procedures         ED Course                                             Medical Decision Making  72-year-old male with right leg pain  Differential diagnosis contusion versus small hematoma, less likely fracture, unlikely DVT as no DVT risk factors, no asymmetrical swelling, no calf tenderness however will obtain outpatient duplex in the morning to further evaluate  Patient high risk for falls due to his age and ambulatory dysfunction as he ambulates with a cane, will hold off on anticoagulation given low suspicion of thromboembolic etiology of his symptoms    We will obtain x-ray to evaluate for fracture Will discharge with careful return precautions and duplex prescription    Risk  OTC drugs  Disposition  Final diagnoses:   Right leg pain   Contusion     Time reflects when diagnosis was documented in both MDM as applicable and the Disposition within this note     Time User Action Codes Description Comment    3/21/2023  3:03 AM Jayla Pham [L79 [id number]] Right leg pain     3/21/2023  3:03 AM Jayla Pham [[id number]] Contusion       ED Disposition     ED Disposition   Discharge    Condition   Stable    Date/Time   Tue Mar 21, 2023  3:36 AM    Comment   Fernanda Garcia discharge to home/self care                 Follow-up Information     Follow up With Specialties Details Why Contact Info Additional Information    Fran Marquez DO Family Medicine Schedule an appointment as soon as possible for a visit   [facility] [address] [facility] Emergency Department Emergency Medicine  If symptoms worsen [address] [place], [id number]  [address] [facility] Emergency Department, [address] [facility], [facility], [address]          Patient's Medications   Discharge Prescriptions    No medications on file       Outpatient Discharge Orders   VAS lower limb venous duplex study, unilateral/limited   Standing Status: Future Standing Exp   Date: 03/21/27       PDMP Review     None          ED Provider  Electronically Signed by           Mary Argueta DO  03/21/23 4611

## 2023-03-31 ENCOUNTER — HOSPITAL ENCOUNTER (OUTPATIENT)
Dept: NON INVASIVE DIAGNOSTICS | Facility: HOSPITAL | Age: 88
Discharge: HOME | End: 2023-03-31
Attending: EMERGENCY MEDICINE

## 2023-03-31 DIAGNOSIS — M79.604 RIGHT LEG PAIN: ICD-10-CM

## 2023-11-23 ENCOUNTER — HOSPITAL ENCOUNTER (EMERGENCY)
Facility: HOSPITAL | Age: 88
Discharge: HOME | End: 2023-11-23
Attending: EMERGENCY MEDICINE
Payer: MEDICARE

## 2023-11-23 VITALS
TEMPERATURE: 98.5 F | HEART RATE: 62 BPM | DIASTOLIC BLOOD PRESSURE: 88 MMHG | SYSTOLIC BLOOD PRESSURE: 197 MMHG | OXYGEN SATURATION: 97 %

## 2023-11-23 DIAGNOSIS — Z51.89 VISIT FOR WOUND CHECK: Primary | ICD-10-CM

## 2023-11-23 PROCEDURE — 99282 EMERGENCY DEPT VISIT SF MDM: CPT

## 2023-11-23 PROCEDURE — 99283 EMERGENCY DEPT VISIT LOW MDM: CPT | Performed by: EMERGENCY MEDICINE

## 2023-11-23 NOTE — ED PROVIDER NOTES
History  Chief Complaint   Patient presents with    Ear Problem     Pt ahd a skin graft on his left ear today , pt got it caught on something and now it wont stop bleeding. 79 yo M presents to ED for wound check after outpt surgery yesterday. He is accompanied by family. 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 He is to leave the dressing in place for 24 hours. He pulled on a sweatshirt and aggravated the dressing, there was some bleeding. It has since stopped. No blood thinners. No trouble breathing/swallowing or talking. He has post-op check next week. No other complaints. History provided by:  Patient and medical records   used: No    Medical Problem  Severity:  Moderate  Onset quality:  Sudden  Timing:  Constant  Progression:  Resolved  Chronicity:  New  Associated symptoms: no abdominal pain, no chest pain, no congestion, no cough, no diarrhea, no ear pain, no fatigue, no fever, no headaches, no loss of consciousness, no myalgias, no nausea, no rash, no rhinorrhea, no shortness of breath, no sore throat, no vomiting and no wheezing        Prior to Admission Medications   Prescriptions Last Dose Informant Patient Reported? Taking? Apoaequorin (PREVAGEN PO)  Self Yes No   Sig: Take by mouth   Cyanocobalamin (VITAMIN B 12 PO)  Self Yes No   Sig: Take by mouth   Saw Palmetto 450 MG CAPS  Self Yes No   Sig: Take 2 tablets by mouth 2 (two) times a day. Patient not taking: Reported on 12/13/2021    Specialty Vitamins Products (PROSTATE PO)  Self Yes No   Sig: Take by mouth   VITAMIN D, CHOLECALCIFEROL, PO  Self Yes No   Sig: Take 5,000 Int'l Units by mouth daily. amLODIPine (NORVASC) 5 mg tablet  Self Yes No   Sig: Take 5 mg by mouth daily. aspirin 81 MG tablet  Self Yes No   Sig: Take 81 mg by mouth 2 (two) times a day.    glyBURIDE (DIABETA) 2.5 mg tablet  Self Yes No   Sig: Take 2.5 mg by mouth daily with dinner nitroglycerin (NITROSTAT) 0.4 mg SL tablet  Self Yes No   Sig: Place 0.4 mg under the tongue every 5 (five) minutes as needed for chest pain. pantoprazole (PROTONIX) 40 mg tablet  Self Yes No   Sig: Take 40 mg by mouth daily   rosuvastatin (CRESTOR) 5 mg tablet  Self Yes No   Sig: Take 2.5 mg by mouth daily. Takes 5x weekly        Facility-Administered Medications: None       Past Medical History:   Diagnosis Date    Anal fissure     Bilateral hearing loss     has h.a. bilaterally    CAD (coronary artery disease)     Cataract of both eyes     Coronary artery disease 1/13/2020    Diabetes mellitus ([address])     Enlarged prostate     Gram negative sepsis ([address]) 2006    HTN (hypertension)     Hx of bladder cancer     Hypercholesterolemia     Iron deficiency anemia     secondary to [facility] E[facility] s/p IV iron    Myocardial infarction ([address]) 2013    Wears glasses        Past Surgical History:   Procedure Laterality Date    ANAL FISSURECTOMY      BLADDER TUMOR EXCISION      CATARACT EXTRACTION, BILATERAL      CHOLECYSTECTOMY      COLONOSCOPY  06/13/2014    CORONARY ARTERY BYPASS GRAFT  1999    X 3 done @ [facility]    CYSTOSTOMY W/ BLADDER BIOPSY      X 2    EGD  06/04/2018    HERNIA REPAIR      MI XCAPSL CTRC RMVL INSJ IO LENS PROSTH W/O ECP Left 7/20/2016    Procedure: EXTRACTION EXTRACAPSULAR CATARACT PHACO INTRAOCULAR LENS (IOL); Surgeon: Viviana Phan MD;  [facility];  Service: Ophthalmology    MI XCAPSL CTRC RMVL INSJ IO LENS PROSTH W/O ECP Right 7/6/2016    Procedure: EXTRACTION EXTRACAPSULAR CATARACT PHACO INTRAOCULAR LENS (IOL);   Surgeon: Viviana Phan MD;  [facility];  Service: Ophthalmology       Family History   Problem Relation Age of Onset    Cancer Mother     Ovarian cancer Mother     Cancer Father     Lung cancer Father     Stomach cancer Sister     Colon cancer Brother     Esophageal cancer Brother      I have reviewed and agree with the history as documented. E-Cigarette/Vaping    E-Cigarette Use Never User      E-Cigarette/Vaping Substances     Social History     Tobacco Use    Smoking status: Never    Smokeless tobacco: Never   Vaping Use    Vaping Use: Never used   Substance Use Topics    Alcohol use: No    Drug use: No       Review of Systems   Constitutional:  Negative for chills, diaphoresis, fatigue, fever and unexpected weight change. HENT:  Negative for congestion, ear pain, rhinorrhea, sore throat, trouble swallowing and voice change. Eyes:  Negative for pain and visual disturbance. Respiratory:  Negative for cough, chest tightness, shortness of breath and wheezing. Cardiovascular:  Negative for chest pain, palpitations and leg swelling. Gastrointestinal:  Negative for abdominal pain, blood in stool, constipation, diarrhea, nausea and vomiting. Genitourinary:  Negative for difficulty urinating and hematuria. Musculoskeletal:  Negative for arthralgias, back pain, myalgias and neck pain. Skin:  Positive for wound. Negative for rash. Neurological:  Negative for dizziness, loss of consciousness, syncope, light-headedness and headaches. Psychiatric/Behavioral:  Negative for confusion and suicidal ideas. The patient is not nervous/anxious. Physical Exam  Physical Exam  Vitals and nursing note reviewed. Constitutional:       General: He is not in acute distress. Appearance: Normal appearance. He is well-developed. He is not ill-appearing, toxic-appearing or diaphoretic. HENT:      Head: Normocephalic and atraumatic. Right Ear: External ear normal.      Left Ear: External ear normal.      Nose: Nose normal.      Mouth/Throat:      Mouth: Mucous membranes are moist.      Pharynx: Oropharynx is clear. Eyes:      General: No scleral icterus. Right eye: No discharge. Left eye: No discharge. Extraocular Movements: Extraocular movements intact.       Conjunctiva/sclera: Conjunctivae normal. Pupils: Pupils are equal, round, and reactive to light. Neck:      Vascular: No JVD. Trachea: No tracheal deviation. Cardiovascular:      Rate and Rhythm: Normal rate and regular rhythm. Pulses: Normal pulses. Heart sounds: Normal heart sounds. No murmur heard. No friction rub. No gallop. Pulmonary:      Effort: Pulmonary effort is normal. No respiratory distress. Breath sounds: Normal breath sounds. No stridor. No wheezing or rales. Chest:      Chest wall: No tenderness. Abdominal:      General: Bowel sounds are normal. There is no distension. Palpations: Abdomen is soft. Tenderness: There is no abdominal tenderness. There is no right CVA tenderness, left CVA tenderness, guarding or rebound. Musculoskeletal:         General: No tenderness or deformity. Normal range of motion. Cervical back: Normal range of motion and neck supple. No rigidity. Right lower leg: No edema. Left lower leg: No edema. Lymphadenopathy:      Cervical: No cervical adenopathy. Skin:     General: Skin is warm and dry. Findings: No rash. Comments: Surgical dressing in place left ear. See photo. Scant dried blood on dressing, no active bleeding. Soft compartments in neck. No pulsatile mass. FROM. NV intact distally. Neurological:      General: No focal deficit present. Mental Status: He is alert and oriented to person, place, and time. Mental status is at baseline. Cranial Nerves: No cranial nerve deficit. Sensory: No sensory deficit. Motor: No weakness.       Coordination: Coordination normal.   Psychiatric:         Mood and Affect: Mood normal.         Behavior: Behavior normal.         Vital Signs  ED Triage Vitals [11/23/23 0142]   Temperature Pulse Resp Blood Pressure SpO2   98.5 °F (36.9 °C) 62 -- (!) 197/88 97 %      Temp Source Heart Rate Source Patient Position - Orthostatic VS BP Location FiO2 (%)   Oral Monitor Sitting Left arm -- Pain Score       --           Vitals:    11/23/23 0142   BP: (!) 197/88   Pulse: 62   Patient Position - Orthostatic VS: Sitting         Visual Acuity      ED Medications  Medications - No data to display    Diagnostic Studies  Results Reviewed       None                   No orders to display              Procedures  Procedures         ED Course  ED Course as of 11/23/23 0255   u Nov 23, 2023   0224 As bleeding has resolved, will continue to observe. No sign of hematoma. Pt in no distress. Is anxious, and kept touching his ear. Had to remind him multiple times not to handle the dressing. Will observe, if stable, will likely d/c home. SBIRT 20yo+      Flowsheet Row Most Recent Value   Initial Alcohol Screen: US AUDIT-C     1. How often do you have a drink containing alcohol? 0 Filed at: 11/23/2023 0208   2. How many drinks containing alcohol do you have on a typical day you are drinking? 0 Filed at: 11/23/2023 0208   3a. Male UNDER 65: How often do you have five or more drinks on one occasion? 0 Filed at: 11/23/2023 0208   3b. FEMALE Any Age, or MALE 65+: How often do you have 4 or more drinks on one occassion? 0 Filed at: 11/23/2023 0208   Audit-C Score 0 Filed at: 11/23/2023 0208   LORENA: How many times in the past year have you. .. Used an illegal drug or used a prescription medication for non-medical reasons? Never Filed at: 11/23/2023 0208                      Medical Decision Making  Problems Addressed:  Visit for wound check: acute illness or injury    Amount and/or Complexity of Data Reviewed  Independent Historian: caregiver  External Data Reviewed: notes.              Disposition  Final diagnoses:   Visit for wound check     Time reflects when diagnosis was documented in both MDM as applicable and the Disposition within this note       Time User Action Codes Description Comment    11/23/2023  2:54 AM Stephanie Corona Add [Z51.89] Visit for wound check           ED Disposition       ED Disposition   Discharge    Condition   Stable    Date/Time   Thu Nov 23, 2023 0254    Comment   Carl Luh Radha discharge to home/self care. Follow-up Information       Follow up With Specialties Details Why Contact Info Additional Information     [address] [facility] Emergency Department Emergency Medicine  If symptoms worsen [address] [facility] [id number]  [address] [facility] Emergency Department, [address] [facility], [place], [address] [facility] [address]    Twyla Benavidez DO Family Medicine Schedule an appointment as soon as possible for a visit   [address]  [phone]               Patient's Medications   Discharge Prescriptions    No medications on file       No discharge procedures on file.     PDMP Review       None            ED Provider  Electronically Signed by             Kamla Harkins MD  11/23/23 6035

## 2024-02-22 ENCOUNTER — HOSPITAL ENCOUNTER (EMERGENCY)
Facility: HOSPITAL | Age: 89
Discharge: HOME | End: 2024-02-22
Attending: EMERGENCY MEDICINE
Payer: MEDICARE

## 2024-02-22 ENCOUNTER — APPOINTMENT (EMERGENCY)
Dept: RADIOLOGY | Facility: HOSPITAL | Age: 89
End: 2024-02-22
Payer: MEDICARE

## 2024-02-22 VITALS
RESPIRATION RATE: 18 BRPM | OXYGEN SATURATION: 98 % | DIASTOLIC BLOOD PRESSURE: 77 MMHG | SYSTOLIC BLOOD PRESSURE: 163 MMHG | HEART RATE: 57 BPM | TEMPERATURE: 97.2 F

## 2024-02-22 DIAGNOSIS — D64.9 ANEMIA: Primary | ICD-10-CM

## 2024-02-22 DIAGNOSIS — R07.9 CHEST PAIN: ICD-10-CM

## 2024-02-22 LAB
2HR DELTA HS TROPONIN: 0 NG/L
ALBUMIN SERPL BCP-MCNC: 4.1 G/DL (ref 3.5–5)
ALP SERPL-CCNC: 58 U/L (ref 34–104)
ALT SERPL W P-5'-P-CCNC: 12 U/L (ref 7–52)
ANION GAP SERPL CALCULATED.3IONS-SCNC: 6 MMOL/L
APTT PPP: 47 SECONDS (ref 23–37)
AST SERPL W P-5'-P-CCNC: 18 U/L (ref 13–39)
ATRIAL RATE: 64 BPM
BASOPHILS # BLD AUTO: 0.02 THOUSANDS/ÂΜL (ref 0–0.1)
BASOPHILS NFR BLD AUTO: 0 % (ref 0–1)
BILIRUB SERPL-MCNC: 0.39 MG/DL (ref 0.2–1)
BUN SERPL-MCNC: 26 MG/DL (ref 5–25)
CALCIUM SERPL-MCNC: 9 MG/DL (ref 8.4–10.2)
CARDIAC TROPONIN I PNL SERPL HS: 8 NG/L
CARDIAC TROPONIN I PNL SERPL HS: 8 NG/L
CHLORIDE SERPL-SCNC: 107 MMOL/L (ref 96–108)
CO2 SERPL-SCNC: 25 MMOL/L (ref 21–32)
CREAT SERPL-MCNC: 1.53 MG/DL (ref 0.6–1.3)
EOSINOPHIL # BLD AUTO: 0.17 THOUSAND/ÂΜL (ref 0–0.61)
EOSINOPHIL NFR BLD AUTO: 4 % (ref 0–6)
ERYTHROCYTE [DISTWIDTH] IN BLOOD BY AUTOMATED COUNT: 12.6 % (ref 11.6–15.1)
GFR SERPL CREATININE-BSD FRML MDRD: 39 ML/MIN/1.73SQ M
GLUCOSE SERPL-MCNC: 109 MG/DL (ref 65–140)
HCT VFR BLD AUTO: 34.5 % (ref 36.5–49.3)
HGB BLD-MCNC: 11.3 G/DL (ref 12–17)
IMM GRANULOCYTES # BLD AUTO: 0.01 THOUSAND/UL (ref 0–0.2)
IMM GRANULOCYTES NFR BLD AUTO: 0 % (ref 0–2)
INR PPP: 2.84 (ref 0.84–1.19)
LIPASE SERPL-CCNC: 8 U/L (ref 11–82)
LYMPHOCYTES # BLD AUTO: 0.93 THOUSANDS/ÂΜL (ref 0.6–4.47)
LYMPHOCYTES NFR BLD AUTO: 20 % (ref 14–44)
MCH RBC QN AUTO: 31.4 PG (ref 26.8–34.3)
MCHC RBC AUTO-ENTMCNC: 32.8 G/DL (ref 31.4–37.4)
MCV RBC AUTO: 96 FL (ref 82–98)
MONOCYTES # BLD AUTO: 0.5 THOUSAND/ÂΜL (ref 0.17–1.22)
MONOCYTES NFR BLD AUTO: 11 % (ref 4–12)
NEUTROPHILS # BLD AUTO: 3.08 THOUSANDS/ÂΜL (ref 1.85–7.62)
NEUTS SEG NFR BLD AUTO: 65 % (ref 43–75)
NRBC BLD AUTO-RTO: 0 /100 WBCS
PLATELET # BLD AUTO: 167 THOUSANDS/UL (ref 149–390)
PMV BLD AUTO: 10 FL (ref 8.9–12.7)
POTASSIUM SERPL-SCNC: 4.1 MMOL/L (ref 3.5–5.3)
PR INTERVAL: 240 MS
PROT SERPL-MCNC: 6.7 G/DL (ref 6.4–8.4)
PROTHROMBIN TIME: 30.4 SECONDS (ref 11.6–14.5)
QRS AXIS: 33 DEGREES
QRSD INTERVAL: 116 MS
QT INTERVAL: 424 MS
QTC INTERVAL: 437 MS
RBC # BLD AUTO: 3.6 MILLION/UL (ref 3.88–5.62)
SODIUM SERPL-SCNC: 138 MMOL/L (ref 135–147)
T WAVE AXIS: 24 DEGREES
VENTRICULAR RATE: 64 BPM
WBC # BLD AUTO: 4.71 THOUSAND/UL (ref 4.31–10.16)

## 2024-02-22 PROCEDURE — 71045 X-RAY EXAM CHEST 1 VIEW: CPT

## 2024-02-22 PROCEDURE — 36415 COLL VENOUS BLD VENIPUNCTURE: CPT | Performed by: EMERGENCY MEDICINE

## 2024-02-22 PROCEDURE — 93010 ELECTROCARDIOGRAM REPORT: CPT | Performed by: INTERNAL MEDICINE

## 2024-02-22 PROCEDURE — 93005 ELECTROCARDIOGRAM TRACING: CPT

## 2024-02-22 PROCEDURE — 85730 THROMBOPLASTIN TIME PARTIAL: CPT | Performed by: EMERGENCY MEDICINE

## 2024-02-22 PROCEDURE — 85610 PROTHROMBIN TIME: CPT | Performed by: EMERGENCY MEDICINE

## 2024-02-22 PROCEDURE — 80053 COMPREHEN METABOLIC PANEL: CPT | Performed by: EMERGENCY MEDICINE

## 2024-02-22 PROCEDURE — 83690 ASSAY OF LIPASE: CPT | Performed by: EMERGENCY MEDICINE

## 2024-02-22 PROCEDURE — 99285 EMERGENCY DEPT VISIT HI MDM: CPT | Performed by: EMERGENCY MEDICINE

## 2024-02-22 PROCEDURE — 84484 ASSAY OF TROPONIN QUANT: CPT | Performed by: EMERGENCY MEDICINE

## 2024-02-22 PROCEDURE — 85025 COMPLETE CBC W/AUTO DIFF WBC: CPT | Performed by: EMERGENCY MEDICINE

## 2024-02-22 PROCEDURE — 99285 EMERGENCY DEPT VISIT HI MDM: CPT

## 2024-02-22 NOTE — DISCHARGE INSTRUCTIONS
Your red blood cell count was low at today's visit please follow-up with your primary care provider for further evaluation, if you develop uncontrolled bleeding, black or bloody stools , chest pain, lightheadedness or trouble breathing please return to the emergency department for evaluation

## 2024-02-22 NOTE — ED PROVIDER NOTES
History  Chief Complaint   Patient presents with   • Chest Pain     Pt reports chest pain that comes and goes that startedat 2 o'clock this moring, pt did not take anything for it at home, pt reports cardiac history     89-year-old male with history of atrial fibrillation on warfarin presents for evaluation of intermittent chest pain that started around 2 AM, states the pain would last anywhere from a few minutes to half hour and will go away on its own, sometimes it would radiate down his left arm which had some paresthesia and burning sensation which has since gone away.  Nonexertional, no associated shortness of breath nausea or diaphoresis.  Does have significant CAD history with stents placed 4 years ago.         Prior to Admission Medications   Prescriptions Last Dose Informant Patient Reported? Taking?   Apoaequorin (PREVAGEN PO)  Self Yes No   Sig: Take by mouth   Cyanocobalamin (VITAMIN B 12 PO)  Self Yes No   Sig: Take by mouth   Saw Palmetto 450 MG CAPS  Self Yes No   Sig: Take 2 tablets by mouth 2 (two) times a day.     Patient not taking: Reported on 12/13/2021    Specialty Vitamins Products (PROSTATE PO)  Self Yes No   Sig: Take by mouth   VITAMIN D, CHOLECALCIFEROL, PO  Self Yes No   Sig: Take 5,000 Int'l Units by mouth daily.   amLODIPine (NORVASC) 5 mg tablet  Self Yes No   Sig: Take 5 mg by mouth daily.   aspirin 81 MG tablet  Self Yes No   Sig: Take 81 mg by mouth 2 (two) times a day.   glyBURIDE (DIABETA) 2.5 mg tablet  Self Yes No   Sig: Take 2.5 mg by mouth daily with dinner     nitroglycerin (NITROSTAT) 0.4 mg SL tablet  Self Yes No   Sig: Place 0.4 mg under the tongue every 5 (five) minutes as needed for chest pain.   pantoprazole (PROTONIX) 40 mg tablet  Self Yes No   Sig: Take 40 mg by mouth daily   rosuvastatin (CRESTOR) 5 mg tablet  Self Yes No   Sig: Take 2.5 mg by mouth daily. Takes 5x weekly        Facility-Administered Medications: None       Past Medical History:   Diagnosis Date   •  Anal fissure    • Atrial fibrillation (HCC)    • Bilateral hearing loss     has h.a. bilaterally   • CAD (coronary artery disease)    • Cataract of both eyes    • Coronary artery disease 01/13/2020   • Diabetes mellitus (HCC)    • Enlarged prostate    • Gram negative sepsis (HCC) 2006   • HTN (hypertension)    • Hx of bladder cancer    • Hypercholesterolemia    • Iron deficiency anemia     secondary to [facility] & [facility]ito's Erosions s/p IV iron   • Myocardial infarction (HCC) 2013   • Wears glasses        Past Surgical History:   Procedure Laterality Date   • ANAL FISSURECTOMY     • BLADDER TUMOR EXCISION     • CATARACT EXTRACTION, BILATERAL     • CHOLECYSTECTOMY     • COLONOSCOPY  06/13/2014   • CORONARY ARTERY BYPASS GRAFT  1999    X 3 done @ [facility]   • CYSTOSTOMY W/ BLADDER BIOPSY      X 2   • EGD  06/04/2018   • HERNIA REPAIR     • MN XCAPSL CTRC RMVL INSJ IO LENS PROSTH W/O ECP Left 7/20/2016    Procedure: EXTRACTION EXTRACAPSULAR CATARACT PHACO INTRAOCULAR LENS (IOL);  Surgeon: Oscar Reece MD;  [facility];  Service: Ophthalmology   • MN XCAPSL CTRC RMVL INSJ IO LENS PROSTH W/O ECP Right 7/6/2016    Procedure: EXTRACTION EXTRACAPSULAR CATARACT PHACO INTRAOCULAR LENS (IOL);  Surgeon: Oscar Reece MD;  [facility];  Service: Ophthalmology       Family History   Problem Relation Age of Onset   • Cancer Mother    • Ovarian cancer Mother    • Cancer Father    • Lung cancer Father    • Stomach cancer Sister    • Colon cancer Brother    • Esophageal cancer Brother      I have reviewed and agree with the history as documented.    E-Cigarette/Vaping   • E-Cigarette Use Never User      E-Cigarette/Vaping Substances     Social History     Tobacco Use   • Smoking status: Never   • Smokeless tobacco: Never   Vaping Use   • Vaping status: Never Used   Substance Use Topics   • Alcohol use: No   • Drug use: No       Review of Systems   Constitutional:  Negative for appetite change, chills and  fever.   HENT:  Negative for rhinorrhea and sore throat.    Eyes:  Negative for photophobia and visual disturbance.   Respiratory:  Negative for cough and shortness of breath.    Cardiovascular:  Positive for chest pain. Negative for palpitations.   Gastrointestinal:  Negative for abdominal pain and diarrhea.   Genitourinary:  Negative for dysuria, frequency and urgency.   Skin:  Negative for rash.   Neurological:  Negative for dizziness and weakness.   All other systems reviewed and are negative.      Physical Exam  Physical Exam  Vitals and nursing note reviewed.   Constitutional:       Appearance: He is well-developed.   HENT:      Head: Normocephalic and atraumatic.      Right Ear: External ear normal.      Left Ear: External ear normal.   Eyes:      Conjunctiva/sclera: Conjunctivae normal.      Pupils: Pupils are equal, round, and reactive to light.   Neck:      Vascular: No JVD.      Trachea: No tracheal deviation.   Cardiovascular:      Rate and Rhythm: Normal rate and regular rhythm.      Heart sounds: Normal heart sounds. No murmur heard.     No friction rub. No gallop.   Pulmonary:      Effort: Pulmonary effort is normal. No respiratory distress.      Breath sounds: No stridor. No wheezing or rales.   Abdominal:      General: There is no distension.      Palpations: Abdomen is soft. There is no mass.      Tenderness: There is no abdominal tenderness. There is no guarding or rebound.   Musculoskeletal:         General: Normal range of motion.      Cervical back: Normal range of motion and neck supple.   Skin:     General: Skin is warm and dry.      Coloration: Skin is not pale.      Findings: No erythema or rash.   Neurological:      Mental Status: He is alert and oriented to person, place, and time.      Cranial Nerves: No cranial nerve deficit.         Vital Signs  ED Triage Vitals [02/22/24 0412]   Temperature Pulse Respirations Blood Pressure SpO2   (!) 97.2 °F (36.2 °C) 65 16 (!) 183/86 97 %      Temp  Source Heart Rate Source Patient Position - Orthostatic VS BP Location FiO2 (%)   Temporal Monitor -- -- --      Pain Score       --           Vitals:    02/22/24 0412   BP: (!) 183/86   Pulse: 65         Visual Acuity      ED Medications  Medications - No data to display    Diagnostic Studies  Results Reviewed       Procedure Component Value Units Date/Time    CBC and differential [[id number]] Collected: 02/22/24 0421    Lab Status: No result Specimen: Blood from Arm, Right     Comprehensive metabolic panel [[id number]] Collected: 02/22/24 0421    Lab Status: No result Specimen: Blood from Arm, Right     HS Troponin 0hr (reflex protocol) [[id number]] Collected: 02/22/24 0421    Lab Status: No result Specimen: Blood from Arm, Right     Lipase [[id number]] Collected: 02/22/24 0421    Lab Status: No result Specimen: Blood from Arm, Right     Protime-INR [[id number]] Collected: 02/22/24 0421    Lab Status: No result Specimen: Blood from Arm, Right     APTT [[id number]] Collected: 02/22/24 0421    Lab Status: No result Specimen: Blood from Arm, Right                    XR chest portable    (Results Pending)              Procedures  Procedures         ED Course  ED Course as of 02/23/24 0618   Thu Feb 22, 2024 0411 Medical record reviewed patient seen by PCP 12/13/2021 for intermittent chest pain status post cardiac catheterization and stent placement in 2020, since then has been having intermittent chest pain, low suspicion for cardiac etiology at that time, started on Maalox as needed   0428 Procedure Note: EKG  Date/Time: 02/22/24 4:28 AM   Performed by: VISH WHITTINGTON  Authorized by: VISH WHITTINGTON  Indications / Diagnosis: CP  ECG reviewed by me, the ED Provider: yes   The EKG demonstrates:  Rhythm: normal sinus with 1st degree av block  Intervals: normal intervals  Axis: normal axis  QRS/Blocks: normal QRS  ST Changes: No acute ST Changes, no STD/GUILHERME.       0443 Hemoglobin(!): 11.3  Down from 12.5 no acute bleeding, no  melena or hematochezia   0454 Creatinine(!): 1.53  Improved from previous   0458 POCT INR(!): 2.84  On coumadin, therapeutic   0609 Chest pain-free, resting comfortably lab work reviewed and essentially unremarkable, will await delta troponin, discussed with patient that he will need to follow-up with cardiology for further evaluation                               SBIRT 22yo+      Flowsheet Row Most Recent Value   Initial Alcohol Screen: US AUDIT-C     1. How often do you have a drink containing alcohol? 0 Filed at: 02/22/2024 0412   2. How many drinks containing alcohol do you have on a typical day you are drinking?  0 Filed at: 02/22/2024 0412   3a. Male UNDER 65: How often do you have five or more drinks on one occasion? 0 Filed at: 02/22/2024 0412   3b. FEMALE Any Age, or MALE 65+: How often do you have 4 or more drinks on one occassion? 0 Filed at: 02/22/2024 0412   Audit-C Score 0 Filed at: 02/22/2024 0412   LORENA: How many times in the past year have you...    Used an illegal drug or used a prescription medication for non-medical reasons? Never Filed at: 02/22/2024 0412                      Medical Decision Making  89-year-old with history of chest pain differential diagnosis includes ACS, arrhythmia, pneumothorax, less likely pneumonia given no fever, redness of breath or productive cough, msk pain    Amount and/or Complexity of Data Reviewed  Labs: ordered.  Radiology: ordered.             Disposition  Final diagnoses:   None     ED Disposition       None          Follow-up Information    None         Patient's Medications   Discharge Prescriptions    No medications on file       No discharge procedures on file.    PDMP Review       None            ED Provider  Electronically Signed by             Yvette Murray DO  02/23/24 0676

## 2024-02-23 ENCOUNTER — OFFICE VISIT (OUTPATIENT)
Dept: GASTROENTEROLOGY | Facility: CLINIC | Age: 89
End: 2024-02-23
Payer: MEDICARE

## 2024-02-23 VITALS
HEIGHT: 66 IN | SYSTOLIC BLOOD PRESSURE: 128 MMHG | DIASTOLIC BLOOD PRESSURE: 66 MMHG | BODY MASS INDEX: 30.22 KG/M2 | WEIGHT: 188 LBS

## 2024-02-23 DIAGNOSIS — Z80.0 FAMILY HISTORY OF COLON CANCER: ICD-10-CM

## 2024-02-23 DIAGNOSIS — D50.0 IRON DEFICIENCY ANEMIA DUE TO CHRONIC BLOOD LOSS: ICD-10-CM

## 2024-02-23 DIAGNOSIS — K44.9 HIATAL HERNIA: Primary | ICD-10-CM

## 2024-02-23 PROBLEM — I48.91 ATRIAL FIBRILLATION (HCC): Status: ACTIVE | Noted: 2024-02-23

## 2024-02-23 PROCEDURE — 99214 OFFICE O/P EST MOD 30 MIN: CPT | Performed by: INTERNAL MEDICINE

## 2024-02-23 PROCEDURE — G2211 COMPLEX E/M VISIT ADD ON: HCPCS | Performed by: INTERNAL MEDICINE

## 2024-02-23 RX ORDER — HYDRALAZINE HYDROCHLORIDE 10 MG/1
10 TABLET, FILM COATED ORAL 2 TIMES DAILY
COMMUNITY
Start: 2023-12-30

## 2024-02-23 RX ORDER — WARFARIN SODIUM 5 MG/1
TABLET ORAL
COMMUNITY
Start: 2024-02-16

## 2024-02-23 RX ORDER — ISOSORBIDE MONONITRATE 30 MG/1
30 TABLET, EXTENDED RELEASE ORAL DAILY
COMMUNITY

## 2024-02-23 NOTE — PROGRESS NOTES
[facility] - Outpatient Follow-up Note  Chuck Garcia 89 y.o. male MRN: [id number]  Encounter: [id number]    ASSESSMENT AND PLAN:      1. Hiatal hernia  Known large hiatal hernia.  Patient with increasing issues with dysphagia and food getting hung up.  -Continue pantoprazole daily  - FL upper GI UGI; Future    2. Iron deficiency anemia due to chronic blood loss  Extensive workup 2018.  Concluded the anemia was secondary to Lito's erosions and a large hiatal hernia.  Patient previously received IV iron by Dr. Velazco.  Hemoglobin relatively stable around 12.  Recent emergency room visit with hemoglobin with hemoglobin 11.  Recently started on Coumadin secondary to atrial fibrillation  - CBC; Future  - Fe+TIBC+Rick; Future  -Will need close follow-up of H&H since now on anticoagulation    3. Family history of colon cancer  Brother with colon cancer.  Last colonoscopy 2014.  No plans to repeat secondary to age      Follow up appointment: 4 months    _______________________      Chief Complaint   Patient presents with    Follow-up     Pt states he feels like food gets stuck when he's eating. Would like to discuss pantoprazole, not currently taking.       HPI:   Patient is a 89 y.o. male with a significant PMH of hiatal hernia and iron deficiency anemia presenting for follow up regarding upper GI symptoms.  He was evaluated back in 2018 secondary to an iron deficiency anemia.  A workup revealed a large hiatal hernia with Lito's erosions.  Patient was given IV iron and Protonix and has done relatively well.  He has not needed iron in a long time.  He is maintained on Protonix.  He did report that he is having increasing issues with dysphagia.  He reports that food gets hung up in the middle of his chest and he has to drink water to wash it down.  He denies any regurgitation.  He denies any food impactions.  He denies any GI bleeding.  His hemoglobin has remained relatively stable  although he was in the emergency room recently was found to have a hemoglobin of 11 which is about a gram less than his baseline.  He does report that he was started on Coumadin by Dr. Dixon secondary to atrial fibrillation.  He denies any melena or hematochezia.  His weight is stable.    Historical Information   Past Medical History:   Diagnosis Date    Anal fissure     Atrial fibrillation (HCC) 2/23/2024    Bilateral hearing loss     has h.a. bilaterally    CAD (coronary artery disease)     Cancer (HCC)     Cataract of both eyes     Chronic kidney disease     Coronary artery disease 01/13/2020    Diabetes mellitus (HCC)     Enlarged prostate     Esophageal stricture     Gram negative sepsis (HCC) 2006    HTN (hypertension)     Hx of bladder cancer     Hypercholesterolemia     Iron deficiency anemia     secondary to HH & Lito's Erosions s/p IV iron    Myocardial infarction (HCC) 2013    Pancreatitis     Wears glasses      Past Surgical History:   Procedure Laterality Date    ABDOMINAL SURGERY      ANAL FISSURECTOMY      BLADDER TUMOR EXCISION      CATARACT EXTRACTION, BILATERAL      CHOLECYSTECTOMY      COLONOSCOPY  06/13/2014    COLONOSCOPY      CORONARY ARTERY BYPASS GRAFT  1999    X 3 done @ [facility]    CYSTOSTOMY W/ BLADDER BIOPSY      X 2    EGD  06/04/2018    HERNIA REPAIR      CT XCAPSL CTRC RMVL INSJ IO LENS PROSTH W/O ECP Left 07/20/2016    Procedure: EXTRACTION EXTRACAPSULAR CATARACT PHACO INTRAOCULAR LENS (IOL);  Surgeon: Oscar Reece MD;  [facility]: [facility];  Service: Ophthalmology    CT XCAPSL CTRC RMVL INSJ IO LENS PROSTH W/O ECP Right 07/06/2016    Procedure: EXTRACTION EXTRACAPSULAR CATARACT PHACO INTRAOCULAR LENS (IOL);  Surgeon: Oscar Reece MD;  [facility];  Service: Ophthalmology    UPPER GASTROINTESTINAL ENDOSCOPY       Social History     Substance and Sexual Activity   Alcohol Use No     Social History     Substance and Sexual Activity   Drug Use No  "    Social History     Tobacco Use   Smoking Status Never   Smokeless Tobacco Never     Family History   Problem Relation Age of Onset    Cancer Mother     Ovarian cancer Mother     Cancer Father     Lung cancer Father     Stomach cancer Sister     Colon cancer Brother     Esophageal cancer Brother          Current Outpatient Medications:     Cyanocobalamin (VITAMIN B 12 PO)    hydrALAZINE (APRESOLINE) 10 mg tablet    isosorbide mononitrate (IMDUR) 30 mg 24 hr tablet    nitroglycerin (NITROSTAT) 0.4 mg SL tablet    rosuvastatin (CRESTOR) 5 mg tablet    Specialty Vitamins Products (PROSTATE PO)    VITAMIN D, CHOLECALCIFEROL, PO    warfarin (COUMADIN) 5 mg tablet    aspirin 81 MG tablet    pantoprazole (PROTONIX) 40 mg tablet  Allergies   Allergen Reactions    Morphine And Related Other (See Comments)     confused    Morphine Other (See Comments)    Simvastatin Other (See Comments)     muscle ache and knee pain,all statins    muscle ache and knee pain,all statins   muscle ache and knee pain,all statins    Statins Other (See Comments)     Reviewed medications and allergies and updated as indicated    PHYSICAL EXAM:    Blood pressure 128/66, height 5' 5.5\" (1.664 m), weight 85.3 kg (188 lb). Body mass index is 30.81 kg/m².  General Appearance: NAD, cooperative, alert  Eyes: Anicteric  Chest: Clear to auscultation  Heart: Regular rate and rhythm  GI:  Soft, non-tender, non-distended; normal bowel sounds; no masses, no organomegaly   Rectal: Deferred  Musculoskeletal: No edema.  Skin:  No jaundice    Lab Results:   Lab Results   Component Value Date    WBC 4.71 02/22/2024    WBC 7.32 09/02/2022    HGB 11.3 (L) 02/22/2024    HGB 12.5 09/02/2022    MCV 96 02/22/2024     02/22/2024     (L) 09/02/2022     Lab Results   Component Value Date    K 4.1 02/22/2024     02/22/2024    CO2 25 02/22/2024    BUN 26 (H) 02/22/2024    CREATININE 1.53 (H) 02/22/2024    CALCIUM 9.0 02/22/2024    AST 18 02/22/2024    AST " 16 09/02/2022    AST 23 06/09/2016    ALT 12 02/22/2024    ALT 18 09/02/2022    ALT 25 06/09/2016    ALKPHOS 58 02/22/2024    ALKPHOS 77 09/02/2022    ALKPHOS 79 06/09/2016    EGFR 39 02/22/2024       Lab Results   Component Value Date    LIPASE 8 (L) 02/22/2024       Radiology Results:   None recently

## 2024-02-23 NOTE — PATIENT INSTRUCTIONS
Continue pantoprazole daily.  Check CBC and iron panel in April with other blood work.  Upper GI to evaluate size of the hiatal hernia.  Follow-up office visit 4 months

## 2024-02-23 NOTE — LETTER
February 23, 2024     May Enciso DO  [address] [facility] [address]    Patient: Chuck Garcia   YOB: 1933   Date of Visit: 2/23/2024       Dear Dr. Enciso:    Thank you for referring Chuck Garcia to me for evaluation. Below are my notes for this consultation.    If you have questions, please do not hesitate to call me. I look forward to following your patient along with you.         Sincerely,        Esteban Cervantes MD        CC: Dat Cervantes MD  2/23/2024  4:14 PM  Sign when Signing Visit  [facility] - Outpatient Follow-up Note  Chuck Garcia 89 y.o. male MRN: [id number]  Encounter: [id number]    ASSESSMENT AND PLAN:      1. Hiatal hernia  Known large hiatal hernia.  Patient with increasing issues with dysphagia and food getting hung up.  -Continue pantoprazole daily  - FL upper GI UGI; Future    2. Iron deficiency anemia due to chronic blood loss  Extensive workup 2018.  Concluded the anemia was secondary to Lito's erosions and a large hiatal hernia.  Patient previously received IV iron by Dr. Velazco.  Hemoglobin relatively stable around 12.  Recent emergency room visit with hemoglobin with hemoglobin 11.  Recently started on Coumadin secondary to atrial fibrillation  - CBC; Future  - Fe+TIBC+Rick; Future  -Will need close follow-up of H&H since now on anticoagulation    3. Family history of colon cancer  Brother with colon cancer.  Last colonoscopy 2014.  No plans to repeat secondary to age      Follow up appointment: 4 months    _______________________      Chief Complaint   Patient presents with   • Follow-up     Pt states he feels like food gets stuck when he's eating. Would like to discuss pantoprazole, not currently taking.       HPI:   Patient is a 89 y.o. male with a significant PMH of hiatal hernia and iron deficiency anemia presenting for follow up regarding upper GI symptoms.  He was evaluated back  in 2018 secondary to an iron deficiency anemia.  A workup revealed a large hiatal hernia with Lito's erosions.  Patient was given IV iron and Protonix and has done relatively well.  He has not needed iron in a long time.  He is maintained on Protonix.  He did report that he is having increasing issues with dysphagia.  He reports that food gets hung up in the middle of his chest and he has to drink water to wash it down.  He denies any regurgitation.  He denies any food impactions.  He denies any GI bleeding.  His hemoglobin has remained relatively stable although he was in the emergency room recently was found to have a hemoglobin of 11 which is about a gram less than his baseline.  He does report that he was started on Coumadin by Dr. Dixon secondary to atrial fibrillation.  He denies any melena or hematochezia.  His weight is stable.    Historical Information  Past Medical History:   Diagnosis Date   • Anal fissure    • Atrial fibrillation (HCC) 2/23/2024   • Bilateral hearing loss     has h.a. bilaterally   • CAD (coronary artery disease)    • Cancer (HCC)    • Cataract of both eyes    • Chronic kidney disease    • Coronary artery disease 01/13/2020   • Diabetes mellitus (HCC)    • Enlarged prostate    • Esophageal stricture    • Gram negative sepsis (HCC) 2006   • HTN (hypertension)    • Hx of bladder cancer    • Hypercholesterolemia    • Iron deficiency anemia     secondary to HH & Lito's Erosions s/p IV iron   • Myocardial infarction (HCC) 2013   • Pancreatitis    • Wears glasses      Past Surgical History:   Procedure Laterality Date   • ABDOMINAL SURGERY     • ANAL FISSURECTOMY     • BLADDER TUMOR EXCISION     • CATARACT EXTRACTION, BILATERAL     • CHOLECYSTECTOMY     • COLONOSCOPY  06/13/2014   • COLONOSCOPY     • CORONARY ARTERY BYPASS GRAFT  1999    [facility]   • CYSTOSTOMY W/ BLADDER BIOPSY      X 2   • EGD  06/04/2018   • HERNIA REPAIR     • MS XCAPSL CTRC RMVL INSJ IO LENS  "PROSTH W/O ECP Left 07/20/2016    Procedure: EXTRACTION EXTRACAPSULAR CATARACT PHACO INTRAOCULAR LENS (IOL);  Surgeon: Oscar Reece MD;  [facility];  Service: Ophthalmology   • [place] XCAPSL CTRC RMVL INSJ IO LENS PROSTH W/O ECP Right 07/06/2016    Procedure: EXTRACTION EXTRACAPSULAR CATARACT PHACO INTRAOCULAR LENS (IOL);  Surgeon: Oscar Reece MD;  [facility];  Service: Ophthalmology   • UPPER GASTROINTESTINAL ENDOSCOPY       Social History     Substance and Sexual Activity   Alcohol Use No     Social History     Substance and Sexual Activity   Drug Use No     Social History     Tobacco Use   Smoking Status Never   Smokeless Tobacco Never     Family History   Problem Relation Age of Onset   • Cancer Mother    • Ovarian cancer Mother    • Cancer Father    • Lung cancer Father    • Stomach cancer Sister    • Colon cancer Brother    • Esophageal cancer Brother          Current Outpatient Medications:   •  Cyanocobalamin (VITAMIN B 12 PO)  •  hydrALAZINE (APRESOLINE) 10 mg tablet  •  isosorbide mononitrate (IMDUR) 30 mg 24 hr tablet  •  nitroglycerin (NITROSTAT) 0.4 mg SL tablet  •  rosuvastatin (CRESTOR) 5 mg tablet  •  Specialty Vitamins Products (PROSTATE PO)  •  VITAMIN D, CHOLECALCIFEROL, PO  •  warfarin (COUMADIN) 5 mg tablet  •  aspirin 81 MG tablet  •  pantoprazole (PROTONIX) 40 mg tablet  Allergies   Allergen Reactions   • Morphine And Related Other (See Comments)     confused   • Morphine Other (See Comments)   • Simvastatin Other (See Comments)     muscle ache and knee pain,all statins    muscle ache and knee pain,all statins   muscle ache and knee pain,all statins   • Statins Other (See Comments)     Reviewed medications and allergies and updated as indicated    PHYSICAL EXAM:    Blood pressure 128/66, height 5' 5.5\" (1.664 m), weight 85.3 kg (188 lb). Body mass index is 30.81 kg/m².  General Appearance: NAD, cooperative, alert  Eyes: Anicteric  Chest: Clear to auscultation  Heart: " Regular rate and rhythm  GI:  Soft, non-tender, non-distended; normal bowel sounds; no masses, no organomegaly   Rectal: Deferred  Musculoskeletal: No edema.  Skin:  No jaundice    Lab Results:   Lab Results   Component Value Date    WBC 4.71 02/22/2024    WBC 7.32 09/02/2022    HGB 11.3 (L) 02/22/2024    HGB 12.5 09/02/2022    MCV 96 02/22/2024     02/22/2024     (L) 09/02/2022     Lab Results   Component Value Date    K 4.1 02/22/2024     02/22/2024    CO2 25 02/22/2024    BUN 26 (H) 02/22/2024    CREATININE 1.53 (H) 02/22/2024    CALCIUM 9.0 02/22/2024    AST 18 02/22/2024    AST 16 09/02/2022    AST 23 06/09/2016    ALT 12 02/22/2024    ALT 18 09/02/2022    ALT 25 06/09/2016    ALKPHOS 58 02/22/2024    ALKPHOS 77 09/02/2022    ALKPHOS 79 06/09/2016    EGFR 39 02/22/2024       Lab Results   Component Value Date    LIPASE 8 (L) 02/22/2024       Radiology Results:   None recently

## 2024-03-12 NOTE — DISCHARGE INSTRUCTIONS
Call your dermatologist for recommendations if you have any issues. If there is recurrent, persistent bleeding, saturating the dressing, seek medical attention.  Otherwise, follow instructions given to you by dermatologist.
.

## 2024-05-02 ENCOUNTER — HOSPITAL ENCOUNTER (OUTPATIENT)
Dept: RADIOLOGY | Facility: HOSPITAL | Age: 89
Discharge: HOME | End: 2024-05-02
Attending: INTERNAL MEDICINE
Payer: MEDICARE

## 2024-05-02 DIAGNOSIS — K44.9 HIATAL HERNIA: ICD-10-CM

## 2024-05-02 PROCEDURE — 74240 X-RAY XM UPR GI TRC 1CNTRST: CPT

## 2024-06-13 ENCOUNTER — OFFICE VISIT (OUTPATIENT)
Dept: GASTROENTEROLOGY | Facility: CLINIC | Age: 89
End: 2024-06-13
Payer: MEDICARE

## 2024-06-13 VITALS
SYSTOLIC BLOOD PRESSURE: 130 MMHG | BODY MASS INDEX: 29.25 KG/M2 | WEIGHT: 182 LBS | HEIGHT: 66 IN | DIASTOLIC BLOOD PRESSURE: 68 MMHG

## 2024-06-13 DIAGNOSIS — Z80.0 FAMILY HISTORY OF COLON CANCER: Primary | ICD-10-CM

## 2024-06-13 DIAGNOSIS — D50.0 IRON DEFICIENCY ANEMIA DUE TO CHRONIC BLOOD LOSS: ICD-10-CM

## 2024-06-13 DIAGNOSIS — K44.9 HIATAL HERNIA: ICD-10-CM

## 2024-06-13 PROCEDURE — G2211 COMPLEX E/M VISIT ADD ON: HCPCS | Performed by: INTERNAL MEDICINE

## 2024-06-13 PROCEDURE — 99213 OFFICE O/P EST LOW 20 MIN: CPT | Performed by: INTERNAL MEDICINE

## 2024-06-13 RX ORDER — UREA 10 %
325 LOTION (ML) TOPICAL 2 TIMES WEEKLY
COMMUNITY

## 2024-06-13 NOTE — PATIENT INSTRUCTIONS
Continue Protonix once a day.  Continue iron twice a week.  Follow-up with regular doctor and Dr. Dixon.  Follow-up office visit 1 year

## 2024-06-13 NOTE — LETTER
June 13, 2024     May Enciso DO  [address] [facility] [address]    Patient: Chuck aGrcia   YOB: 1933   Date of Visit: 6/13/2024       Dear Dr. Enciso:    Thank you for referring Chuck Garcia to me for evaluation. Below are my notes for this consultation.    If you have questions, please do not hesitate to call me. I look forward to following your patient along with you.         Sincerely,        Esteban Cervantes MD        CC: Dat Cervantes MD  6/13/2024  3:49 PM  Sign when Signing Visit  [facility] - Outpatient Follow-up Note  Chuck Garcia 89 y.o. male MRN: [id number]  Encounter: [id number]    ASSESSMENT AND PLAN:      1. Hiatal hernia  Dysphagia related to large hiatal hernia and esophageal dysmotility.  Not surgical candidate secondary to age.  No evidence of aspiration or weight loss  -Continue to follow-up for now    2. Iron deficiency anemia due to chronic blood loss  Hemoglobin in April stable at 11.  -Continue oral iron daily  -Follow H&H periodically especially now on Coumadin    3. Family history of colon cancer  Brother with colon cancer.  Last colonoscopy June 2014.      Follow up appointment: 1 year    _______________________      Chief Complaint   Patient presents with   • Follow up-anemia, hiatal hernia       HPI:   Patient is a 89 y.o. male with a significant PMH of hiatal hernia & GERD presenting for follow up.  When I last saw him he was complaining of some increasing issues with dysphagia he has a known large hiatal hernia.  He is chronically on Protonix.  Upper GI confirmed the large to medium size hiatal hernia but also showed some esophageal dysmotility.  He reports his swallowing is about the same.  He is able to get food down without too much of a problem.  He denies any food impactions.  He denies any regurgitation.  He denies any chest or abdominal pain.  His weight is stable.  He denies  any GI bleeding.    Historical Information  Past Medical History:   Diagnosis Date   • Anal fissure    • Atrial fibrillation (HCC) 2/23/2024   • Bilateral hearing loss     has h.a. bilaterally   • CAD (coronary artery disease)    • Cancer (HCC)    • Cataract of both eyes    • Chronic kidney disease    • Coronary artery disease 01/13/2020   • Diabetes mellitus (HCC)    • Enlarged prostate    • Esophageal stricture    • Gram negative sepsis (HCC) 2006   • HTN (hypertension)    • Hx of bladder cancer    • Hypercholesterolemia    • Iron deficiency anemia     secondary to HH & Lito's Erosions s/p IV iron   • Myocardial infarction (HCC) 2013   • Pancreatitis    • Wears glasses      Past Surgical History:   Procedure Laterality Date   • ABDOMINAL SURGERY     • ANAL FISSURECTOMY     • BLADDER TUMOR EXCISION     • CATARACT EXTRACTION, BILATERAL     • CHOLECYSTECTOMY     • COLONOSCOPY  06/13/2014   • COLONOSCOPY     • CORONARY ARTERY BYPASS GRAFT  1999    X 3 done @ [facility]   • CYSTOSTOMY W/ BLADDER BIOPSY      X 2   • EGD  06/04/2018   • HERNIA REPAIR     • [place] XCAPSL CTRC RMVL INSJ IO LENS PROSTH W/O ECP Left 07/20/2016    Procedure: EXTRACTION EXTRACAPSULAR CATARACT PHACO INTRAOCULAR LENS (IOL);  Surgeon: Oscar Reece MD;  Location:  MAIN OR;  Service: Ophthalmology   • [place] XCAPSL CTRC RMVL INSJ IO LENS PROSTH W/O ECP Right 07/06/2016    Procedure: EXTRACTION EXTRACAPSULAR CATARACT PHACO INTRAOCULAR LENS (IOL);  Surgeon: Oscar Reece MD;  Location:  MAIN OR;  Service: Ophthalmology   • UPPER GASTROINTESTINAL ENDOSCOPY       Social History     Substance and Sexual Activity   Alcohol Use No     Social History     Substance and Sexual Activity   Drug Use No     Social History     Tobacco Use   Smoking Status Never   Smokeless Tobacco Never     Family History   Problem Relation Age of Onset   • Cancer Mother    • Ovarian cancer Mother    • Cancer Father    • Lung cancer Father    • Stomach cancer Sister   "  • Colon cancer Brother    • Esophageal cancer Brother          Current Outpatient Medications:   •  Cyanocobalamin (VITAMIN B 12 PO)  •  ferrous sulfate 325 (65 FE) MG EC tablet  •  hydrALAZINE (APRESOLINE) 10 mg tablet  •  isosorbide mononitrate (IMDUR) 30 mg 24 hr tablet  •  nitroglycerin (NITROSTAT) 0.4 mg SL tablet  •  rosuvastatin (CRESTOR) 5 mg tablet  •  Specialty Vitamins Products (PROSTATE PO)  •  VITAMIN D, CHOLECALCIFEROL, PO  •  warfarin (COUMADIN) 5 mg tablet  •  pantoprazole (PROTONIX) 40 mg tablet  Allergies   Allergen Reactions   • Morphine And Codeine Other (See Comments)     confused   • Morphine Other (See Comments)   • Simvastatin Other (See Comments)     muscle ache and knee pain,all statins    muscle ache and knee pain,all statins   muscle ache and knee pain,all statins   • Statins Other (See Comments)     Reviewed medications and allergies and updated as indicated    PHYSICAL EXAM:    Blood pressure 130/68, height 5' 5.5\" (1.664 m), weight 82.6 kg (182 lb). Body mass index is 29.83 kg/m².  General Appearance: NAD, cooperative, alert  Eyes: Anicteric  Chest: Clear to auscultation  Heart: Regular rate and rhythm  GI:  Soft, non-tender, non-distended; normal bowel sounds; no masses, no organomegaly   Rectal: Deferred  Musculoskeletal: No edema.  Skin:  No jaundice    Lab Results:   Lab Results   Component Value Date    WBC 4.71 02/22/2024    WBC 7.32 09/02/2022    HGB 11.3 (L) 02/22/2024    HGB 12.5 09/02/2022    MCV 96 02/22/2024     02/22/2024     (L) 09/02/2022     Lab Results   Component Value Date    K 4.1 02/22/2024     02/22/2024    CO2 25 02/22/2024    BUN 26 (H) 02/22/2024    CREATININE 1.53 (H) 02/22/2024    CALCIUM 9.0 02/22/2024    AST 18 02/22/2024    AST 16 09/02/2022    AST 23 06/09/2016    ALT 12 02/22/2024    ALT 18 09/02/2022    ALT 25 06/09/2016    ALKPHOS 58 02/22/2024    ALKPHOS 77 09/02/2022    ALKPHOS 79 06/09/2016    EGFR 39 02/22/2024     Lab Results "   Component Value Date    LIPASE 8 (L) 02/22/2024       Radiology Results:   No results found.

## 2024-10-30 ENCOUNTER — TELEPHONE (OUTPATIENT)
Age: 89
End: 2024-10-30

## 2024-10-30 ENCOUNTER — NURSE TRIAGE (OUTPATIENT)
Age: 89
End: 2024-10-30

## 2024-10-30 NOTE — TELEPHONE ENCOUNTER
Patient contacted office with complaints of change in bowels. Call transferred to nurse triage to further assist.

## 2024-10-30 NOTE — TELEPHONE ENCOUNTER
"Change in bowel habits and color.  Stool is softer than normal.    When has bm it comes out soft and dark brown and gooey for about 1 month.  Denies black stool, nausea, vomiting, abdominal pain, or frequency change.  Denies any changes in medication.  Patient taking iron pills twice a week. Advised patient change is most likely from taking iron.  Iron can darken stools as well as change consistency.  Please advise.    .     Reason for Disposition   Unusual stool color probably from food or medicine    Answer Assessment - Initial Assessment Questions  1. COLOR: \"What color is your stool?\" \"Is that color in part or all of the stool?\" (e.g., black, graciela-colored, green, red)       Darker brown and softer than normal.    2. ONSET: \"When did you first notice this color change?\"      1 month ago.   3. CAUSE: \"Have you eaten any food or taken any medicine of this color?\" Note: See listing in Background Information section.       denies  4. OTHER SYMPTOMS: \"Do you have any other symptoms?\" (e.g., abdomen pain, diarrhea, fever, yellow eyes or skin).      denies    Protocols used: Stools - Unusual Color-Adult-[place]    "

## 2024-11-08 NOTE — TELEPHONE ENCOUNTER
Pt is inquiring about cologuard. Pt doesn't want to have a colonoscopy at his age but would like cologuard, understands this may not be covered by insurance. Please advise.

## 2025-07-15 ENCOUNTER — APPOINTMENT (OUTPATIENT)
Dept: LAB | Facility: CLINIC | Age: OVER 89
End: 2025-07-15
Attending: INTERNAL MEDICINE
Payer: MEDICARE

## 2025-07-15 DIAGNOSIS — Z79.01 LONG TERM (CURRENT) USE OF ANTICOAGULANTS: ICD-10-CM

## 2025-07-15 DIAGNOSIS — I48.0 PAROXYSMAL ATRIAL FIBRILLATION (HCC): ICD-10-CM

## 2025-07-15 LAB
INR PPP: 2.46 (ref 0.85–1.19)
INR PPP: 2.46 (ref 0.85–1.19)
PROTHROMBIN TIME: 26.6 SECONDS (ref 12.3–15)

## 2025-07-15 PROCEDURE — 36415 COLL VENOUS BLD VENIPUNCTURE: CPT

## 2025-07-15 PROCEDURE — 85610 PROTHROMBIN TIME: CPT

## 2025-07-30 ENCOUNTER — ANTICOAG VISIT (OUTPATIENT)
Age: OVER 89
End: 2025-07-30

## 2025-07-30 DIAGNOSIS — I48.91 ATRIAL FIBRILLATION, UNSPECIFIED TYPE (HCC): Primary | ICD-10-CM

## 2025-08-11 ENCOUNTER — APPOINTMENT (OUTPATIENT)
Age: OVER 89
End: 2025-08-11
Payer: MEDICARE

## 2025-08-12 ENCOUNTER — ANTICOAG VISIT (OUTPATIENT)
Age: OVER 89
End: 2025-08-12
Payer: MEDICARE